# Patient Record
Sex: MALE | Employment: OTHER | URBAN - NONMETROPOLITAN AREA
[De-identification: names, ages, dates, MRNs, and addresses within clinical notes are randomized per-mention and may not be internally consistent; named-entity substitution may affect disease eponyms.]

---

## 2020-01-01 ENCOUNTER — OFFICE VISIT (OUTPATIENT)
Dept: FAMILY MEDICINE | Facility: OTHER | Age: 70
End: 2020-01-01
Attending: PHYSICIAN ASSISTANT
Payer: COMMERCIAL

## 2020-01-01 ENCOUNTER — HOSPITAL ENCOUNTER (INPATIENT)
Facility: HOSPITAL | Age: 70
LOS: 5 days | Discharge: HOME | DRG: 330 | End: 2020-08-05
Attending: EMERGENCY MEDICINE | Admitting: SURGERY
Payer: COMMERCIAL

## 2020-01-01 ENCOUNTER — APPOINTMENT (OUTPATIENT)
Dept: GENERAL RADIOLOGY | Facility: HOSPITAL | Age: 70
DRG: 330 | End: 2020-01-01
Attending: SURGERY
Payer: COMMERCIAL

## 2020-01-01 ENCOUNTER — DOCUMENTATION ONLY (OUTPATIENT)
Dept: OTHER | Facility: CLINIC | Age: 70
End: 2020-01-01

## 2020-01-01 ENCOUNTER — TELEPHONE (OUTPATIENT)
Dept: FAMILY MEDICINE | Facility: OTHER | Age: 70
End: 2020-01-01

## 2020-01-01 ENCOUNTER — APPOINTMENT (OUTPATIENT)
Dept: CT IMAGING | Facility: HOSPITAL | Age: 70
DRG: 330 | End: 2020-01-01
Attending: EMERGENCY MEDICINE
Payer: COMMERCIAL

## 2020-01-01 ENCOUNTER — OFFICE VISIT (OUTPATIENT)
Dept: SURGERY | Facility: OTHER | Age: 70
End: 2020-01-01
Attending: SURGERY
Payer: COMMERCIAL

## 2020-01-01 ENCOUNTER — TRANSFERRED RECORDS (OUTPATIENT)
Dept: HEALTH INFORMATION MANAGEMENT | Facility: CLINIC | Age: 70
End: 2020-01-01

## 2020-01-01 ENCOUNTER — ANESTHESIA (OUTPATIENT)
Dept: SURGERY | Facility: HOSPITAL | Age: 70
DRG: 330 | End: 2020-01-01
Payer: COMMERCIAL

## 2020-01-01 ENCOUNTER — ANESTHESIA EVENT (OUTPATIENT)
Dept: SURGERY | Facility: HOSPITAL | Age: 70
DRG: 330 | End: 2020-01-01
Payer: COMMERCIAL

## 2020-01-01 ENCOUNTER — APPOINTMENT (OUTPATIENT)
Dept: WOUND CARE | Facility: HOSPITAL | Age: 70
End: 2020-01-01
Payer: COMMERCIAL

## 2020-01-01 ENCOUNTER — APPOINTMENT (OUTPATIENT)
Dept: GENERAL RADIOLOGY | Facility: HOSPITAL | Age: 70
DRG: 330 | End: 2020-01-01
Attending: EMERGENCY MEDICINE
Payer: COMMERCIAL

## 2020-01-01 ENCOUNTER — PATIENT OUTREACH (OUTPATIENT)
Dept: ONCOLOGY | Facility: OTHER | Age: 70
End: 2020-01-01

## 2020-01-01 VITALS
DIASTOLIC BLOOD PRESSURE: 78 MMHG | RESPIRATION RATE: 20 BRPM | HEIGHT: 69 IN | BODY MASS INDEX: 23.12 KG/M2 | SYSTOLIC BLOOD PRESSURE: 152 MMHG | HEART RATE: 101 BPM | WEIGHT: 156.09 LBS | TEMPERATURE: 97.4 F | OXYGEN SATURATION: 100 %

## 2020-01-01 VITALS
SYSTOLIC BLOOD PRESSURE: 126 MMHG | HEIGHT: 69 IN | BODY MASS INDEX: 22.07 KG/M2 | HEART RATE: 110 BPM | TEMPERATURE: 97.7 F | DIASTOLIC BLOOD PRESSURE: 74 MMHG | OXYGEN SATURATION: 100 % | WEIGHT: 149 LBS

## 2020-01-01 VITALS
SYSTOLIC BLOOD PRESSURE: 106 MMHG | TEMPERATURE: 96.9 F | WEIGHT: 149 LBS | OXYGEN SATURATION: 92 % | BODY MASS INDEX: 22 KG/M2 | HEART RATE: 100 BPM | DIASTOLIC BLOOD PRESSURE: 50 MMHG

## 2020-01-01 DIAGNOSIS — Z53.9 PERSONS ENCOUNTERING HEALTH SERVICES FOR SPECIFIC PROCEDURES, NOT CARRIED OUT: Primary | ICD-10-CM

## 2020-01-01 DIAGNOSIS — K63.89 MASS OF COLON: Primary | ICD-10-CM

## 2020-01-01 DIAGNOSIS — K63.89 COLONIC MASS: ICD-10-CM

## 2020-01-01 DIAGNOSIS — K63.89 MASS OF HEPATIC FLEXURE OF COLON: Primary | ICD-10-CM

## 2020-01-01 DIAGNOSIS — K56.609 SMALL BOWEL OBSTRUCTION (H): ICD-10-CM

## 2020-01-01 DIAGNOSIS — C18.9 ADENOCARCINOMA OF COLON (H): ICD-10-CM

## 2020-01-01 DIAGNOSIS — J44.9 CHRONIC OBSTRUCTIVE PULMONARY DISEASE, UNSPECIFIED COPD TYPE (H): ICD-10-CM

## 2020-01-01 DIAGNOSIS — Z93.2 S/P ILEOSTOMY (H): ICD-10-CM

## 2020-01-01 DIAGNOSIS — E87.1 HYPONATREMIA: ICD-10-CM

## 2020-01-01 LAB
ABO + RH BLD: NORMAL
ABO + RH BLD: NORMAL
ALBUMIN SERPL-MCNC: 1.6 G/DL (ref 3.4–5)
ALBUMIN SERPL-MCNC: 1.6 G/DL (ref 3.4–5)
ALBUMIN SERPL-MCNC: 2.1 G/DL (ref 3.4–5)
ALBUMIN UR-MCNC: NEGATIVE MG/DL
ALP SERPL-CCNC: 214 U/L (ref 40–150)
ALP SERPL-CCNC: 265 U/L (ref 40–150)
ALP SERPL-CCNC: 267 U/L (ref 40–150)
ALT SERPL W P-5'-P-CCNC: 26 U/L (ref 0–70)
ALT SERPL W P-5'-P-CCNC: 26 U/L (ref 0–70)
ALT SERPL W P-5'-P-CCNC: 27 U/L (ref 0–70)
ANION GAP SERPL CALCULATED.3IONS-SCNC: 4 MMOL/L (ref 3–14)
ANION GAP SERPL CALCULATED.3IONS-SCNC: 5 MMOL/L (ref 3–14)
ANION GAP SERPL CALCULATED.3IONS-SCNC: 7 MMOL/L (ref 3–14)
ANION GAP SERPL CALCULATED.3IONS-SCNC: 8 MMOL/L (ref 3–14)
APPEARANCE UR: CLEAR
AST SERPL W P-5'-P-CCNC: 143 U/L (ref 0–45)
AST SERPL W P-5'-P-CCNC: 187 U/L (ref 0–45)
AST SERPL W P-5'-P-CCNC: 257 U/L (ref 0–45)
BACTERIA #/AREA URNS HPF: ABNORMAL /HPF
BASOPHILS # BLD AUTO: 0 10E9/L (ref 0–0.2)
BASOPHILS NFR BLD AUTO: 0 %
BASOPHILS NFR BLD AUTO: 0.1 %
BILIRUB DIRECT SERPL-MCNC: 0.1 MG/DL (ref 0–0.2)
BILIRUB SERPL-MCNC: 0.4 MG/DL (ref 0.2–1.3)
BILIRUB SERPL-MCNC: 0.4 MG/DL (ref 0.2–1.3)
BILIRUB SERPL-MCNC: 0.6 MG/DL (ref 0.2–1.3)
BILIRUB UR QL STRIP: NEGATIVE
BLD GP AB SCN SERPL QL: NORMAL
BLOOD BANK CMNT PATIENT-IMP: NORMAL
BLOOD BANK CMNT PATIENT-IMP: NORMAL
BUN SERPL-MCNC: 11 MG/DL (ref 7–30)
BUN SERPL-MCNC: 12 MG/DL (ref 7–30)
BUN SERPL-MCNC: 14 MG/DL (ref 7–30)
BUN SERPL-MCNC: 16 MG/DL (ref 7–30)
CALCIUM SERPL-MCNC: 7.5 MG/DL (ref 8.5–10.1)
CALCIUM SERPL-MCNC: 7.9 MG/DL (ref 8.5–10.1)
CALCIUM SERPL-MCNC: 8.1 MG/DL (ref 8.5–10.1)
CALCIUM SERPL-MCNC: 8.5 MG/DL (ref 8.5–10.1)
CHLORIDE SERPL-SCNC: 105 MMOL/L (ref 94–109)
CHLORIDE SERPL-SCNC: 86 MMOL/L (ref 94–109)
CHLORIDE SERPL-SCNC: 96 MMOL/L (ref 94–109)
CHLORIDE SERPL-SCNC: 99 MMOL/L (ref 94–109)
CO2 SERPL-SCNC: 23 MMOL/L (ref 20–32)
CO2 SERPL-SCNC: 26 MMOL/L (ref 20–32)
CO2 SERPL-SCNC: 27 MMOL/L (ref 20–32)
CO2 SERPL-SCNC: 28 MMOL/L (ref 20–32)
COLOR UR AUTO: YELLOW
COPATH REPORT: NORMAL
CREAT SERPL-MCNC: 0.46 MG/DL (ref 0.66–1.25)
CREAT SERPL-MCNC: 0.55 MG/DL (ref 0.66–1.25)
CREAT SERPL-MCNC: 0.59 MG/DL (ref 0.66–1.25)
CREAT SERPL-MCNC: 0.62 MG/DL (ref 0.66–1.25)
DIFFERENTIAL METHOD BLD: ABNORMAL
EOSINOPHIL # BLD AUTO: 0 10E9/L (ref 0–0.7)
EOSINOPHIL # BLD AUTO: 0.1 10E9/L (ref 0–0.7)
EOSINOPHIL NFR BLD AUTO: 0 %
EOSINOPHIL NFR BLD AUTO: 1 %
ERYTHROCYTE [DISTWIDTH] IN BLOOD BY AUTOMATED COUNT: 12.1 % (ref 10–15)
ERYTHROCYTE [DISTWIDTH] IN BLOOD BY AUTOMATED COUNT: 12.1 % (ref 10–15)
ERYTHROCYTE [DISTWIDTH] IN BLOOD BY AUTOMATED COUNT: 12.3 % (ref 10–15)
ERYTHROCYTE [DISTWIDTH] IN BLOOD BY AUTOMATED COUNT: 12.6 % (ref 10–15)
GFR SERPL CREATININE-BSD FRML MDRD: >90 ML/MIN/{1.73_M2}
GLUCOSE SERPL-MCNC: 103 MG/DL (ref 70–99)
GLUCOSE SERPL-MCNC: 104 MG/DL (ref 70–99)
GLUCOSE SERPL-MCNC: 108 MG/DL (ref 70–99)
GLUCOSE SERPL-MCNC: 111 MG/DL (ref 70–99)
GLUCOSE UR STRIP-MCNC: NEGATIVE MG/DL
HCT VFR BLD AUTO: 31.4 % (ref 40–53)
HCT VFR BLD AUTO: 32.9 % (ref 40–53)
HCT VFR BLD AUTO: 33.3 % (ref 40–53)
HCT VFR BLD AUTO: 36.7 % (ref 40–53)
HGB BLD-MCNC: 10.9 G/DL (ref 13.3–17.7)
HGB BLD-MCNC: 11.4 G/DL (ref 13.3–17.7)
HGB BLD-MCNC: 11.5 G/DL (ref 13.3–17.7)
HGB BLD-MCNC: 13.2 G/DL (ref 13.3–17.7)
HGB UR QL STRIP: NEGATIVE
HYALINE CASTS #/AREA URNS LPF: 1 /LPF
IMM GRANULOCYTES # BLD: 0.2 10E9/L (ref 0–0.4)
IMM GRANULOCYTES NFR BLD: 2.7 %
INR PPP: 1.31 (ref 0.86–1.14)
KETONES UR STRIP-MCNC: NEGATIVE MG/DL
LABORATORY COMMENT REPORT: NORMAL
LEUKOCYTE ESTERASE UR QL STRIP: NEGATIVE
LIPASE SERPL-CCNC: 41 U/L (ref 73–393)
LYMPHOCYTES # BLD AUTO: 0.4 10E9/L (ref 0.8–5.3)
LYMPHOCYTES # BLD AUTO: 0.7 10E9/L (ref 0.8–5.3)
LYMPHOCYTES # BLD AUTO: 0.9 10E9/L (ref 0.8–5.3)
LYMPHOCYTES # BLD AUTO: 1.1 10E9/L (ref 0.8–5.3)
LYMPHOCYTES NFR BLD AUTO: 11 %
LYMPHOCYTES NFR BLD AUTO: 12 %
LYMPHOCYTES NFR BLD AUTO: 5.3 %
LYMPHOCYTES NFR BLD AUTO: 9 %
MAGNESIUM SERPL-MCNC: 2.4 MG/DL (ref 1.6–2.3)
MAGNESIUM SERPL-MCNC: 2.8 MG/DL (ref 1.6–2.3)
MCH RBC QN AUTO: 31.5 PG (ref 26.5–33)
MCH RBC QN AUTO: 31.7 PG (ref 26.5–33)
MCH RBC QN AUTO: 31.8 PG (ref 26.5–33)
MCH RBC QN AUTO: 32 PG (ref 26.5–33)
MCHC RBC AUTO-ENTMCNC: 34.2 G/DL (ref 31.5–36.5)
MCHC RBC AUTO-ENTMCNC: 34.7 G/DL (ref 31.5–36.5)
MCHC RBC AUTO-ENTMCNC: 35 G/DL (ref 31.5–36.5)
MCHC RBC AUTO-ENTMCNC: 36 G/DL (ref 31.5–36.5)
MCV RBC AUTO: 89 FL (ref 78–100)
MCV RBC AUTO: 91 FL (ref 78–100)
MCV RBC AUTO: 91 FL (ref 78–100)
MCV RBC AUTO: 93 FL (ref 78–100)
MONOCYTES # BLD AUTO: 0.3 10E9/L (ref 0–1.3)
MONOCYTES # BLD AUTO: 0.4 10E9/L (ref 0–1.3)
MONOCYTES # BLD AUTO: 0.6 10E9/L (ref 0–1.3)
MONOCYTES # BLD AUTO: 0.7 10E9/L (ref 0–1.3)
MONOCYTES NFR BLD AUTO: 3 %
MONOCYTES NFR BLD AUTO: 4 %
MONOCYTES NFR BLD AUTO: 9 %
MONOCYTES NFR BLD AUTO: 9.6 %
MUCOUS THREADS #/AREA URNS LPF: PRESENT /LPF
MYELOCYTES # BLD: 0.2 10E9/L
MYELOCYTES # BLD: 0.3 10E9/L
MYELOCYTES NFR BLD MANUAL: 2 %
MYELOCYTES NFR BLD MANUAL: 3 %
NEUTROPHILS # BLD AUTO: 5.3 10E9/L (ref 1.6–8.3)
NEUTROPHILS # BLD AUTO: 5.7 10E9/L (ref 1.6–8.3)
NEUTROPHILS # BLD AUTO: 7.3 10E9/L (ref 1.6–8.3)
NEUTROPHILS # BLD AUTO: 8.3 10E9/L (ref 1.6–8.3)
NEUTROPHILS NFR BLD AUTO: 79 %
NEUTROPHILS NFR BLD AUTO: 80 %
NEUTROPHILS NFR BLD AUTO: 82.3 %
NEUTROPHILS NFR BLD AUTO: 84 %
NEUTS BAND # BLD AUTO: 0.3 10E9/L (ref 0–0.6)
NEUTS BAND NFR BLD MANUAL: 3 %
NITRATE UR QL: NEGATIVE
NRBC # BLD AUTO: 0 10*3/UL
NRBC BLD AUTO-RTO: 0 /100
PH UR STRIP: 6 PH (ref 4.7–8)
PLATELET # BLD AUTO: 226 10E9/L (ref 150–450)
PLATELET # BLD AUTO: 230 10E9/L (ref 150–450)
PLATELET # BLD AUTO: 249 10E9/L (ref 150–450)
PLATELET # BLD AUTO: 253 10E9/L (ref 150–450)
PLATELET # BLD AUTO: 295 10E9/L (ref 150–450)
PLATELET # BLD EST: ABNORMAL 10*3/UL
PLATELET # BLD EST: ABNORMAL 10*3/UL
POTASSIUM SERPL-SCNC: 3.9 MMOL/L (ref 3.4–5.3)
POTASSIUM SERPL-SCNC: 4.1 MMOL/L (ref 3.4–5.3)
POTASSIUM SERPL-SCNC: 4.5 MMOL/L (ref 3.4–5.3)
POTASSIUM SERPL-SCNC: 5.1 MMOL/L (ref 3.4–5.3)
PROCALCITONIN SERPL-MCNC: 0.25 NG/ML
PROT SERPL-MCNC: 5.1 G/DL (ref 6.8–8.8)
PROT SERPL-MCNC: 5.3 G/DL (ref 6.8–8.8)
PROT SERPL-MCNC: 6.5 G/DL (ref 6.8–8.8)
RBC # BLD AUTO: 3.46 10E12/L (ref 4.4–5.9)
RBC # BLD AUTO: 3.59 10E12/L (ref 4.4–5.9)
RBC # BLD AUTO: 3.63 10E12/L (ref 4.4–5.9)
RBC # BLD AUTO: 4.13 10E12/L (ref 4.4–5.9)
RBC #/AREA URNS AUTO: 1 /HPF (ref 0–2)
RBC MORPH BLD: ABNORMAL
RBC MORPH BLD: ABNORMAL
SARS-COV-2 RNA SPEC QL NAA+PROBE: NEGATIVE
SARS-COV-2 RNA SPEC QL NAA+PROBE: NORMAL
SODIUM SERPL-SCNC: 122 MMOL/L (ref 133–144)
SODIUM SERPL-SCNC: 130 MMOL/L (ref 133–144)
SODIUM SERPL-SCNC: 130 MMOL/L (ref 133–144)
SODIUM SERPL-SCNC: 132 MMOL/L (ref 133–144)
SOURCE: ABNORMAL
SP GR UR STRIP: 1.02 (ref 1–1.03)
SPECIMEN EXP DATE BLD: NORMAL
SPECIMEN SOURCE: NORMAL
SPECIMEN SOURCE: NORMAL
UROBILINOGEN UR STRIP-MCNC: 4 MG/DL (ref 0–2)
WBC # BLD AUTO: 6.6 10E9/L (ref 4–11)
WBC # BLD AUTO: 7 10E9/L (ref 4–11)
WBC # BLD AUTO: 9.2 10E9/L (ref 4–11)
WBC # BLD AUTO: 9.9 10E9/L (ref 4–11)
WBC #/AREA URNS AUTO: 1 /HPF (ref 0–5)

## 2020-01-01 PROCEDURE — G0463 HOSPITAL OUTPT CLINIC VISIT: HCPCS

## 2020-01-01 PROCEDURE — 81445 SO NEO GSAP 5-50DNA/DNA&RNA: CPT | Mod: ZL,59 | Performed by: INTERNAL MEDICINE

## 2020-01-01 PROCEDURE — 99285 EMERGENCY DEPT VISIT HI MDM: CPT | Mod: Z6 | Performed by: EMERGENCY MEDICINE

## 2020-01-01 PROCEDURE — 80048 BASIC METABOLIC PNL TOTAL CA: CPT | Performed by: INTERNAL MEDICINE

## 2020-01-01 PROCEDURE — 80053 COMPREHEN METABOLIC PANEL: CPT | Performed by: INTERNAL MEDICINE

## 2020-01-01 PROCEDURE — 44144 PARTIAL REMOVAL OF COLON: CPT | Performed by: NURSE ANESTHETIST, CERTIFIED REGISTERED

## 2020-01-01 PROCEDURE — 25000566 ZZH SEVOFLURANE, EA 15 MIN: Performed by: NURSE ANESTHETIST, CERTIFIED REGISTERED

## 2020-01-01 PROCEDURE — 25000125 ZZHC RX 250

## 2020-01-01 PROCEDURE — 25800030 ZZH RX IP 258 OP 636: Performed by: SURGERY

## 2020-01-01 PROCEDURE — 25000125 ZZHC RX 250: Performed by: INTERNAL MEDICINE

## 2020-01-01 PROCEDURE — 25000132 ZZH RX MED GY IP 250 OP 250 PS 637: Performed by: SURGERY

## 2020-01-01 PROCEDURE — 27210794 ZZH OR GENERAL SUPPLY STERILE: Performed by: SURGERY

## 2020-01-01 PROCEDURE — 25800025 ZZH RX 258: Performed by: SURGERY

## 2020-01-01 PROCEDURE — 99232 SBSQ HOSP IP/OBS MODERATE 35: CPT | Mod: 24 | Performed by: INTERNAL MEDICINE

## 2020-01-01 PROCEDURE — 88307 TISSUE EXAM BY PATHOLOGIST: CPT | Mod: TC | Performed by: SURGERY

## 2020-01-01 PROCEDURE — 85025 COMPLETE CBC W/AUTO DIFF WBC: CPT | Performed by: INTERNAL MEDICINE

## 2020-01-01 PROCEDURE — 94640 AIRWAY INHALATION TREATMENT: CPT

## 2020-01-01 PROCEDURE — 81445 SO NEO GSAP 5-50DNA/DNA&RNA: CPT | Mod: ZL | Performed by: INTERNAL MEDICINE

## 2020-01-01 PROCEDURE — 25000132 ZZH RX MED GY IP 250 OP 250 PS 637: Performed by: INTERNAL MEDICINE

## 2020-01-01 PROCEDURE — 83690 ASSAY OF LIPASE: CPT | Performed by: EMERGENCY MEDICINE

## 2020-01-01 PROCEDURE — 12000000 ZZH R&B MED SURG/OB

## 2020-01-01 PROCEDURE — 25000125 ZZHC RX 250: Performed by: NURSE ANESTHETIST, CERTIFIED REGISTERED

## 2020-01-01 PROCEDURE — 25000128 H RX IP 250 OP 636: Performed by: SURGERY

## 2020-01-01 PROCEDURE — 36000056 ZZH SURGERY LEVEL 3 1ST 30 MIN: Performed by: SURGERY

## 2020-01-01 PROCEDURE — 80053 COMPREHEN METABOLIC PANEL: CPT | Performed by: SURGERY

## 2020-01-01 PROCEDURE — 99285 EMERGENCY DEPT VISIT HI MDM: CPT | Mod: 25

## 2020-01-01 PROCEDURE — 40000275 ZZH STATISTIC RCP TIME EA 10 MIN

## 2020-01-01 PROCEDURE — 74177 CT ABD & PELVIS W/CONTRAST: CPT | Mod: TC

## 2020-01-01 PROCEDURE — 36415 COLL VENOUS BLD VENIPUNCTURE: CPT | Performed by: INTERNAL MEDICINE

## 2020-01-01 PROCEDURE — 85610 PROTHROMBIN TIME: CPT | Performed by: SURGERY

## 2020-01-01 PROCEDURE — 36000058 ZZH SURGERY LEVEL 3 EA 15 ADDTL MIN: Performed by: SURGERY

## 2020-01-01 PROCEDURE — 94640 AIRWAY INHALATION TREATMENT: CPT | Mod: 76

## 2020-01-01 PROCEDURE — 96374 THER/PROPH/DIAG INJ IV PUSH: CPT

## 2020-01-01 PROCEDURE — 44310 ILEOSTOMY/JEJUNOSTOMY: CPT | Performed by: SURGERY

## 2020-01-01 PROCEDURE — 0D1B0Z4 BYPASS ILEUM TO CUTANEOUS, OPEN APPROACH: ICD-10-PCS | Performed by: SURGERY

## 2020-01-01 PROCEDURE — 71000014 ZZH RECOVERY PHASE 1 LEVEL 2 FIRST HR: Performed by: SURGERY

## 2020-01-01 PROCEDURE — 81001 URINALYSIS AUTO W/SCOPE: CPT | Performed by: EMERGENCY MEDICINE

## 2020-01-01 PROCEDURE — 99223 1ST HOSP IP/OBS HIGH 75: CPT | Mod: 25 | Performed by: INTERNAL MEDICINE

## 2020-01-01 PROCEDURE — 96376 TX/PRO/DX INJ SAME DRUG ADON: CPT

## 2020-01-01 PROCEDURE — 25000128 H RX IP 250 OP 636: Performed by: NURSE ANESTHETIST, CERTIFIED REGISTERED

## 2020-01-01 PROCEDURE — 86901 BLOOD TYPING SEROLOGIC RH(D): CPT | Performed by: SURGERY

## 2020-01-01 PROCEDURE — 37000009 ZZH ANESTHESIA TECHNICAL FEE, EACH ADDTL 15 MIN: Performed by: SURGERY

## 2020-01-01 PROCEDURE — 85025 COMPLETE CBC W/AUTO DIFF WBC: CPT | Performed by: SURGERY

## 2020-01-01 PROCEDURE — 25800030 ZZH RX IP 258 OP 636: Performed by: NURSE ANESTHETIST, CERTIFIED REGISTERED

## 2020-01-01 PROCEDURE — 00000159 ZZHCL STATISTIC H-SEND OUTS PREP: Performed by: SURGERY

## 2020-01-01 PROCEDURE — 25000128 H RX IP 250 OP 636: Performed by: EMERGENCY MEDICINE

## 2020-01-01 PROCEDURE — 36415 COLL VENOUS BLD VENIPUNCTURE: CPT | Performed by: SURGERY

## 2020-01-01 PROCEDURE — 83735 ASSAY OF MAGNESIUM: CPT | Performed by: SURGERY

## 2020-01-01 PROCEDURE — 25000128 H RX IP 250 OP 636: Performed by: INTERNAL MEDICINE

## 2020-01-01 PROCEDURE — 25500064 ZZH RX 255 OP 636: Performed by: RADIOLOGY

## 2020-01-01 PROCEDURE — 27110028 ZZH OR GENERAL SUPPLY NON-STERILE: Performed by: SURGERY

## 2020-01-01 PROCEDURE — 83735 ASSAY OF MAGNESIUM: CPT | Performed by: INTERNAL MEDICINE

## 2020-01-01 PROCEDURE — 99233 SBSQ HOSP IP/OBS HIGH 50: CPT | Mod: 24 | Performed by: INTERNAL MEDICINE

## 2020-01-01 PROCEDURE — 37000008 ZZH ANESTHESIA TECHNICAL FEE, 1ST 30 MIN: Performed by: SURGERY

## 2020-01-01 PROCEDURE — 99024 POSTOP FOLLOW-UP VISIT: CPT | Performed by: SURGERY

## 2020-01-01 PROCEDURE — 36415 COLL VENOUS BLD VENIPUNCTURE: CPT | Mod: ZL | Performed by: INTERNAL MEDICINE

## 2020-01-01 PROCEDURE — 85025 COMPLETE CBC W/AUTO DIFF WBC: CPT | Performed by: EMERGENCY MEDICINE

## 2020-01-01 PROCEDURE — 81301 MICROSATELLITE INSTABILITY: CPT | Mod: ZL,59 | Performed by: INTERNAL MEDICINE

## 2020-01-01 PROCEDURE — G0180 MD CERTIFICATION HHA PATIENT: HCPCS | Performed by: FAMILY MEDICINE

## 2020-01-01 PROCEDURE — 86850 RBC ANTIBODY SCREEN: CPT | Performed by: SURGERY

## 2020-01-01 PROCEDURE — 99231 SBSQ HOSP IP/OBS SF/LOW 25: CPT | Mod: 24 | Performed by: INTERNAL MEDICINE

## 2020-01-01 PROCEDURE — 25000132 ZZH RX MED GY IP 250 OP 250 PS 637: Performed by: EMERGENCY MEDICINE

## 2020-01-01 PROCEDURE — 74018 RADEX ABDOMEN 1 VIEW: CPT | Mod: TC

## 2020-01-01 PROCEDURE — 71045 X-RAY EXAM CHEST 1 VIEW: CPT | Mod: TC

## 2020-01-01 PROCEDURE — 80076 HEPATIC FUNCTION PANEL: CPT | Performed by: EMERGENCY MEDICINE

## 2020-01-01 PROCEDURE — 80048 BASIC METABOLIC PNL TOTAL CA: CPT | Performed by: EMERGENCY MEDICINE

## 2020-01-01 PROCEDURE — G0463 HOSPITAL OUTPT CLINIC VISIT: HCPCS | Mod: 27

## 2020-01-01 PROCEDURE — 0DBU0ZX EXCISION OF OMENTUM, OPEN APPROACH, DIAGNOSTIC: ICD-10-PCS | Performed by: SURGERY

## 2020-01-01 PROCEDURE — C9803 HOPD COVID-19 SPEC COLLECT: HCPCS

## 2020-01-01 PROCEDURE — 86900 BLOOD TYPING SEROLOGIC ABO: CPT | Performed by: SURGERY

## 2020-01-01 PROCEDURE — 85049 AUTOMATED PLATELET COUNT: CPT | Performed by: SURGERY

## 2020-01-01 PROCEDURE — 87635 SARS-COV-2 COVID-19 AMP PRB: CPT | Performed by: EMERGENCY MEDICINE

## 2020-01-01 PROCEDURE — 84145 PROCALCITONIN (PCT): CPT | Performed by: EMERGENCY MEDICINE

## 2020-01-01 PROCEDURE — 99239 HOSP IP/OBS DSCHRG MGMT >30: CPT | Mod: 24 | Performed by: NURSE PRACTITIONER

## 2020-01-01 PROCEDURE — 99213 OFFICE O/P EST LOW 20 MIN: CPT | Performed by: PHYSICIAN ASSISTANT

## 2020-01-01 PROCEDURE — 25000125 ZZHC RX 250: Performed by: SURGERY

## 2020-01-01 PROCEDURE — 99223 1ST HOSP IP/OBS HIGH 75: CPT | Mod: 57 | Performed by: SURGERY

## 2020-01-01 RX ORDER — MEPERIDINE HYDROCHLORIDE 25 MG/ML
12.5 INJECTION INTRAMUSCULAR; INTRAVENOUS; SUBCUTANEOUS EVERY 5 MIN PRN
Status: DISCONTINUED | OUTPATIENT
Start: 2020-01-01 | End: 2020-01-01 | Stop reason: HOSPADM

## 2020-01-01 RX ORDER — NALOXONE HYDROCHLORIDE 0.4 MG/ML
.1-.4 INJECTION, SOLUTION INTRAMUSCULAR; INTRAVENOUS; SUBCUTANEOUS
Status: DISCONTINUED | OUTPATIENT
Start: 2020-01-01 | End: 2020-01-01 | Stop reason: HOSPADM

## 2020-01-01 RX ORDER — HYDROCODONE BITARTRATE AND ACETAMINOPHEN 5; 325 MG/1; MG/1
1 TABLET ORAL ONCE
Status: COMPLETED | OUTPATIENT
Start: 2020-01-01 | End: 2020-01-01

## 2020-01-01 RX ORDER — LIDOCAINE 40 MG/G
CREAM TOPICAL
Status: DISCONTINUED | OUTPATIENT
Start: 2020-01-01 | End: 2020-01-01

## 2020-01-01 RX ORDER — BUPIVACAINE HYDROCHLORIDE AND EPINEPHRINE 2.5; 5 MG/ML; UG/ML
INJECTION, SOLUTION INFILTRATION; PERINEURAL PRN
Status: DISCONTINUED | OUTPATIENT
Start: 2020-01-01 | End: 2020-01-01 | Stop reason: HOSPADM

## 2020-01-01 RX ORDER — NALOXONE HYDROCHLORIDE 0.4 MG/ML
.1-.4 INJECTION, SOLUTION INTRAMUSCULAR; INTRAVENOUS; SUBCUTANEOUS
Status: DISCONTINUED | OUTPATIENT
Start: 2020-01-01 | End: 2020-01-01

## 2020-01-01 RX ORDER — FENTANYL CITRATE 50 UG/ML
INJECTION, SOLUTION INTRAMUSCULAR; INTRAVENOUS PRN
Status: DISCONTINUED | OUTPATIENT
Start: 2020-01-01 | End: 2020-01-01

## 2020-01-01 RX ORDER — ACETAMINOPHEN 325 MG/1
975 TABLET ORAL EVERY 6 HOURS PRN
COMMUNITY
Start: 2020-01-01

## 2020-01-01 RX ORDER — ONDANSETRON 2 MG/ML
INJECTION INTRAMUSCULAR; INTRAVENOUS PRN
Status: DISCONTINUED | OUTPATIENT
Start: 2020-01-01 | End: 2020-01-01

## 2020-01-01 RX ORDER — ONDANSETRON 2 MG/ML
4 INJECTION INTRAMUSCULAR; INTRAVENOUS EVERY 30 MIN PRN
Status: DISCONTINUED | OUTPATIENT
Start: 2020-01-01 | End: 2020-01-01 | Stop reason: HOSPADM

## 2020-01-01 RX ORDER — HYDROMORPHONE HYDROCHLORIDE 1 MG/ML
.3-.5 INJECTION, SOLUTION INTRAMUSCULAR; INTRAVENOUS; SUBCUTANEOUS
Status: DISCONTINUED | OUTPATIENT
Start: 2020-01-01 | End: 2020-01-01

## 2020-01-01 RX ORDER — LIDOCAINE HYDROCHLORIDE 20 MG/ML
INJECTION, SOLUTION INFILTRATION; PERINEURAL PRN
Status: DISCONTINUED | OUTPATIENT
Start: 2020-01-01 | End: 2020-01-01

## 2020-01-01 RX ORDER — FENTANYL CITRATE 50 UG/ML
25-50 INJECTION, SOLUTION INTRAMUSCULAR; INTRAVENOUS
Status: DISCONTINUED | OUTPATIENT
Start: 2020-01-01 | End: 2020-01-01 | Stop reason: HOSPADM

## 2020-01-01 RX ORDER — SODIUM CHLORIDE, SODIUM LACTATE, POTASSIUM CHLORIDE, CALCIUM CHLORIDE 600; 310; 30; 20 MG/100ML; MG/100ML; MG/100ML; MG/100ML
INJECTION, SOLUTION INTRAVENOUS CONTINUOUS
Status: DISCONTINUED | OUTPATIENT
Start: 2020-01-01 | End: 2020-01-01

## 2020-01-01 RX ORDER — KETOROLAC TROMETHAMINE 15 MG/ML
15 INJECTION, SOLUTION INTRAMUSCULAR; INTRAVENOUS EVERY 6 HOURS
Status: DISCONTINUED | OUTPATIENT
Start: 2020-01-01 | End: 2020-01-01

## 2020-01-01 RX ORDER — CEFOTETAN AND DEXTROSE 1 G/50ML
1 INJECTION, SOLUTION INTRAVENOUS SEE ADMIN INSTRUCTIONS
Status: DISCONTINUED | OUTPATIENT
Start: 2020-01-01 | End: 2020-01-01

## 2020-01-01 RX ORDER — DEXTROSE MONOHYDRATE, SODIUM CHLORIDE, AND POTASSIUM CHLORIDE 50; 1.49; 9 G/1000ML; G/1000ML; G/1000ML
INJECTION, SOLUTION INTRAVENOUS CONTINUOUS
Status: DISCONTINUED | OUTPATIENT
Start: 2020-01-01 | End: 2020-01-01

## 2020-01-01 RX ORDER — IPRATROPIUM BROMIDE AND ALBUTEROL SULFATE 2.5; .5 MG/3ML; MG/3ML
SOLUTION RESPIRATORY (INHALATION)
Status: COMPLETED
Start: 2020-01-01 | End: 2020-01-01

## 2020-01-01 RX ORDER — KETOROLAC TROMETHAMINE 15 MG/ML
15 INJECTION, SOLUTION INTRAMUSCULAR; INTRAVENOUS EVERY 6 HOURS
Status: DISCONTINUED | OUTPATIENT
Start: 2020-01-01 | End: 2020-01-01 | Stop reason: HOSPADM

## 2020-01-01 RX ORDER — HYDROMORPHONE HYDROCHLORIDE 1 MG/ML
.3-.5 INJECTION, SOLUTION INTRAMUSCULAR; INTRAVENOUS; SUBCUTANEOUS EVERY 5 MIN PRN
Status: DISCONTINUED | OUTPATIENT
Start: 2020-01-01 | End: 2020-01-01 | Stop reason: HOSPADM

## 2020-01-01 RX ORDER — NALOXONE HYDROCHLORIDE 0.4 MG/ML
.1-.4 INJECTION, SOLUTION INTRAMUSCULAR; INTRAVENOUS; SUBCUTANEOUS
Status: ACTIVE | OUTPATIENT
Start: 2020-01-01 | End: 2020-01-01

## 2020-01-01 RX ORDER — ACETAMINOPHEN 325 MG/1
975 TABLET ORAL ONCE
Status: DISCONTINUED | OUTPATIENT
Start: 2020-01-01 | End: 2020-01-01 | Stop reason: HOSPADM

## 2020-01-01 RX ORDER — ACETAMINOPHEN 325 MG/1
TABLET ORAL
Status: DISCONTINUED
Start: 2020-01-01 | End: 2020-01-01 | Stop reason: HOSPADM

## 2020-01-01 RX ORDER — NICOTINE 21 MG/24HR
1 PATCH, TRANSDERMAL 24 HOURS TRANSDERMAL DAILY
Status: DISCONTINUED | OUTPATIENT
Start: 2020-01-01 | End: 2020-01-01

## 2020-01-01 RX ORDER — SODIUM CHLORIDE, SODIUM LACTATE, POTASSIUM CHLORIDE, CALCIUM CHLORIDE 600; 310; 30; 20 MG/100ML; MG/100ML; MG/100ML; MG/100ML
INJECTION, SOLUTION INTRAVENOUS CONTINUOUS PRN
Status: DISCONTINUED | OUTPATIENT
Start: 2020-01-01 | End: 2020-01-01

## 2020-01-01 RX ORDER — ACETAMINOPHEN 325 MG/1
975 TABLET ORAL EVERY 6 HOURS
Status: DISCONTINUED | OUTPATIENT
Start: 2020-01-01 | End: 2020-01-01 | Stop reason: HOSPADM

## 2020-01-01 RX ORDER — ALBUTEROL SULFATE 90 UG/1
2 AEROSOL, METERED RESPIRATORY (INHALATION) EVERY 6 HOURS PRN
DISCHARGE
Start: 2020-01-01

## 2020-01-01 RX ORDER — HYDROMORPHONE HYDROCHLORIDE 1 MG/ML
0.5 INJECTION, SOLUTION INTRAMUSCULAR; INTRAVENOUS; SUBCUTANEOUS ONCE
Status: COMPLETED | OUTPATIENT
Start: 2020-01-01 | End: 2020-01-01

## 2020-01-01 RX ORDER — ALBUTEROL SULFATE 90 UG/1
2 AEROSOL, METERED RESPIRATORY (INHALATION) EVERY 6 HOURS PRN
Status: DISCONTINUED | OUTPATIENT
Start: 2020-01-01 | End: 2020-01-01 | Stop reason: HOSPADM

## 2020-01-01 RX ORDER — PROPOFOL 10 MG/ML
INJECTION, EMULSION INTRAVENOUS PRN
Status: DISCONTINUED | OUTPATIENT
Start: 2020-01-01 | End: 2020-01-01

## 2020-01-01 RX ORDER — LIDOCAINE 4 G/G
1 PATCH TOPICAL
Status: DISCONTINUED | OUTPATIENT
Start: 2020-01-01 | End: 2020-01-01 | Stop reason: HOSPADM

## 2020-01-01 RX ORDER — DEXAMETHASONE SODIUM PHOSPHATE 10 MG/ML
INJECTION, SOLUTION INTRAMUSCULAR; INTRAVENOUS PRN
Status: DISCONTINUED | OUTPATIENT
Start: 2020-01-01 | End: 2020-01-01

## 2020-01-01 RX ORDER — ACETAMINOPHEN 10 MG/ML
1000 INJECTION, SOLUTION INTRAVENOUS EVERY 6 HOURS
Status: DISCONTINUED | OUTPATIENT
Start: 2020-01-01 | End: 2020-01-01

## 2020-01-01 RX ORDER — HYDROCODONE BITARTRATE AND ACETAMINOPHEN 5; 325 MG/1; MG/1
TABLET ORAL
Qty: 30 TABLET | Refills: 0 | Status: SHIPPED | OUTPATIENT
Start: 2020-01-01

## 2020-01-01 RX ORDER — IOPAMIDOL 612 MG/ML
100 INJECTION, SOLUTION INTRAVASCULAR ONCE
Status: COMPLETED | OUTPATIENT
Start: 2020-01-01 | End: 2020-01-01

## 2020-01-01 RX ORDER — TRAMADOL HYDROCHLORIDE 50 MG/1
100 TABLET ORAL EVERY 6 HOURS PRN
COMMUNITY

## 2020-01-01 RX ORDER — SODIUM CHLORIDE, SODIUM LACTATE, POTASSIUM CHLORIDE, CALCIUM CHLORIDE 600; 310; 30; 20 MG/100ML; MG/100ML; MG/100ML; MG/100ML
INJECTION, SOLUTION INTRAVENOUS CONTINUOUS
Status: DISCONTINUED | OUTPATIENT
Start: 2020-01-01 | End: 2020-01-01 | Stop reason: HOSPADM

## 2020-01-01 RX ORDER — IPRATROPIUM BROMIDE AND ALBUTEROL SULFATE 2.5; .5 MG/3ML; MG/3ML
3 SOLUTION RESPIRATORY (INHALATION) EVERY 4 HOURS PRN
Status: DISCONTINUED | OUTPATIENT
Start: 2020-01-01 | End: 2020-01-01 | Stop reason: HOSPADM

## 2020-01-01 RX ORDER — CEFOTETAN AND DEXTROSE 1 G/50ML
1 INJECTION, SOLUTION INTRAVENOUS
Status: DISCONTINUED | OUTPATIENT
Start: 2020-01-01 | End: 2020-01-01

## 2020-01-01 RX ORDER — ONDANSETRON 4 MG/1
4 TABLET, ORALLY DISINTEGRATING ORAL EVERY 30 MIN PRN
Status: DISCONTINUED | OUTPATIENT
Start: 2020-01-01 | End: 2020-01-01 | Stop reason: HOSPADM

## 2020-01-01 RX ORDER — HYDROMORPHONE HYDROCHLORIDE 1 MG/ML
0.3 INJECTION, SOLUTION INTRAMUSCULAR; INTRAVENOUS; SUBCUTANEOUS
Status: DISCONTINUED | OUTPATIENT
Start: 2020-01-01 | End: 2020-01-01 | Stop reason: HOSPADM

## 2020-01-01 RX ORDER — LABETALOL 20 MG/4 ML (5 MG/ML) INTRAVENOUS SYRINGE
10
Status: DISCONTINUED | OUTPATIENT
Start: 2020-01-01 | End: 2020-01-01 | Stop reason: HOSPADM

## 2020-01-01 RX ORDER — HYDROMORPHONE HYDROCHLORIDE 1 MG/ML
0.5 INJECTION, SOLUTION INTRAMUSCULAR; INTRAVENOUS; SUBCUTANEOUS
Status: DISCONTINUED | OUTPATIENT
Start: 2020-01-01 | End: 2020-01-01

## 2020-01-01 RX ORDER — LIDOCAINE 40 MG/G
CREAM TOPICAL
Status: DISCONTINUED | OUTPATIENT
Start: 2020-01-01 | End: 2020-01-01 | Stop reason: HOSPADM

## 2020-01-01 RX ADMIN — ENOXAPARIN SODIUM 40 MG: 40 INJECTION SUBCUTANEOUS at 21:22

## 2020-01-01 RX ADMIN — KETOROLAC TROMETHAMINE 15 MG: 15 INJECTION, SOLUTION INTRAMUSCULAR; INTRAVENOUS at 05:55

## 2020-01-01 RX ADMIN — KETOROLAC TROMETHAMINE 15 MG: 15 INJECTION, SOLUTION INTRAMUSCULAR; INTRAVENOUS at 17:00

## 2020-01-01 RX ADMIN — HYDROMORPHONE HYDROCHLORIDE 0.5 MG: 1 INJECTION, SOLUTION INTRAMUSCULAR; INTRAVENOUS; SUBCUTANEOUS at 10:26

## 2020-01-01 RX ADMIN — ACETAMINOPHEN 975 MG: 325 TABLET, FILM COATED ORAL at 14:22

## 2020-01-01 RX ADMIN — SODIUM CHLORIDE, POTASSIUM CHLORIDE, SODIUM LACTATE AND CALCIUM CHLORIDE: 600; 310; 30; 20 INJECTION, SOLUTION INTRAVENOUS at 13:00

## 2020-01-01 RX ADMIN — HYDROMORPHONE HYDROCHLORIDE 0.5 MG: 1 INJECTION, SOLUTION INTRAMUSCULAR; INTRAVENOUS; SUBCUTANEOUS at 17:24

## 2020-01-01 RX ADMIN — ENOXAPARIN SODIUM 40 MG: 40 INJECTION SUBCUTANEOUS at 20:32

## 2020-01-01 RX ADMIN — KETOROLAC TROMETHAMINE 15 MG: 15 INJECTION, SOLUTION INTRAMUSCULAR; INTRAVENOUS at 17:38

## 2020-01-01 RX ADMIN — POTASSIUM CHLORIDE, DEXTROSE MONOHYDRATE AND SODIUM CHLORIDE: 150; 5; 900 INJECTION, SOLUTION INTRAVENOUS at 07:40

## 2020-01-01 RX ADMIN — HYDROMORPHONE HYDROCHLORIDE 0.5 MG: 1 INJECTION, SOLUTION INTRAMUSCULAR; INTRAVENOUS; SUBCUTANEOUS at 23:19

## 2020-01-01 RX ADMIN — FENTANYL CITRATE 25 MCG: 50 INJECTION, SOLUTION INTRAMUSCULAR; INTRAVENOUS at 19:47

## 2020-01-01 RX ADMIN — Medication 1 PATCH: at 02:30

## 2020-01-01 RX ADMIN — FENTANYL CITRATE 25 MCG: 50 INJECTION, SOLUTION INTRAMUSCULAR; INTRAVENOUS at 19:52

## 2020-01-01 RX ADMIN — KETOROLAC TROMETHAMINE 15 MG: 15 INJECTION, SOLUTION INTRAMUSCULAR; INTRAVENOUS at 22:46

## 2020-01-01 RX ADMIN — ACETAMINOPHEN 975 MG: 325 TABLET, FILM COATED ORAL at 11:30

## 2020-01-01 RX ADMIN — ACETAMINOPHEN 975 MG: 325 TABLET, FILM COATED ORAL at 05:07

## 2020-01-01 RX ADMIN — DEXAMETHASONE SODIUM PHOSPHATE 10 MG: 10 INJECTION, SOLUTION INTRAMUSCULAR; INTRAVENOUS at 18:10

## 2020-01-01 RX ADMIN — HYDROMORPHONE HYDROCHLORIDE 0.5 MG: 1 INJECTION, SOLUTION INTRAMUSCULAR; INTRAVENOUS; SUBCUTANEOUS at 12:36

## 2020-01-01 RX ADMIN — HYDROMORPHONE HYDROCHLORIDE 0.4 MG: 1 INJECTION, SOLUTION INTRAMUSCULAR; INTRAVENOUS; SUBCUTANEOUS at 02:31

## 2020-01-01 RX ADMIN — ROCURONIUM BROMIDE 10 MG: 10 INJECTION INTRAVENOUS at 17:55

## 2020-01-01 RX ADMIN — FENTANYL CITRATE 25 MCG: 50 INJECTION, SOLUTION INTRAMUSCULAR; INTRAVENOUS at 19:25

## 2020-01-01 RX ADMIN — KETOROLAC TROMETHAMINE 15 MG: 15 INJECTION, SOLUTION INTRAMUSCULAR; INTRAVENOUS at 05:01

## 2020-01-01 RX ADMIN — KETOROLAC TROMETHAMINE 15 MG: 15 INJECTION, SOLUTION INTRAMUSCULAR; INTRAVENOUS at 11:42

## 2020-01-01 RX ADMIN — IPRATROPIUM BROMIDE AND ALBUTEROL SULFATE 3 ML: .5; 3 SOLUTION RESPIRATORY (INHALATION) at 20:00

## 2020-01-01 RX ADMIN — HYDROMORPHONE HYDROCHLORIDE 0.3 MG: 1 INJECTION, SOLUTION INTRAMUSCULAR; INTRAVENOUS; SUBCUTANEOUS at 23:59

## 2020-01-01 RX ADMIN — KETOROLAC TROMETHAMINE 15 MG: 15 INJECTION, SOLUTION INTRAMUSCULAR; INTRAVENOUS at 21:45

## 2020-01-01 RX ADMIN — KETOROLAC TROMETHAMINE 15 MG: 15 INJECTION, SOLUTION INTRAMUSCULAR; INTRAVENOUS at 10:28

## 2020-01-01 RX ADMIN — ACETAMINOPHEN 975 MG: 325 TABLET, FILM COATED ORAL at 05:58

## 2020-01-01 RX ADMIN — SODIUM CHLORIDE, POTASSIUM CHLORIDE, SODIUM LACTATE AND CALCIUM CHLORIDE: 600; 310; 30; 20 INJECTION, SOLUTION INTRAVENOUS at 17:51

## 2020-01-01 RX ADMIN — KETOROLAC TROMETHAMINE 15 MG: 15 INJECTION, SOLUTION INTRAMUSCULAR; INTRAVENOUS at 17:47

## 2020-01-01 RX ADMIN — KETOROLAC TROMETHAMINE 15 MG: 15 INJECTION, SOLUTION INTRAMUSCULAR; INTRAVENOUS at 05:58

## 2020-01-01 RX ADMIN — UMECLIDINIUM 1 PUFF: 62.5 AEROSOL, POWDER ORAL at 08:33

## 2020-01-01 RX ADMIN — Medication 100 MG: at 17:55

## 2020-01-01 RX ADMIN — PROPOFOL 130 MG: 10 INJECTION, EMULSION INTRAVENOUS at 17:55

## 2020-01-01 RX ADMIN — ENOXAPARIN SODIUM 40 MG: 40 INJECTION SUBCUTANEOUS at 20:47

## 2020-01-01 RX ADMIN — SODIUM CHLORIDE, POTASSIUM CHLORIDE, SODIUM LACTATE AND CALCIUM CHLORIDE: 600; 310; 30; 20 INJECTION, SOLUTION INTRAVENOUS at 21:18

## 2020-01-01 RX ADMIN — HYDROMORPHONE HYDROCHLORIDE 0.5 MG: 1 INJECTION, SOLUTION INTRAMUSCULAR; INTRAVENOUS; SUBCUTANEOUS at 14:22

## 2020-01-01 RX ADMIN — UMECLIDINIUM 1 PUFF: 62.5 AEROSOL, POWDER ORAL at 10:35

## 2020-01-01 RX ADMIN — ACETAMINOPHEN 975 MG: 325 TABLET, FILM COATED ORAL at 22:46

## 2020-01-01 RX ADMIN — IOPAMIDOL 100 ML: 612 INJECTION, SOLUTION INTRAVENOUS at 09:38

## 2020-01-01 RX ADMIN — ACETAMINOPHEN 975 MG: 325 TABLET, FILM COATED ORAL at 17:46

## 2020-01-01 RX ADMIN — KETOROLAC TROMETHAMINE 15 MG: 15 INJECTION, SOLUTION INTRAMUSCULAR; INTRAVENOUS at 23:25

## 2020-01-01 RX ADMIN — LIDOCAINE HYDROCHLORIDE 40 MG: 20 INJECTION, SOLUTION INFILTRATION; PERINEURAL at 17:55

## 2020-01-01 RX ADMIN — FENTANYL CITRATE 25 MCG: 50 INJECTION, SOLUTION INTRAMUSCULAR; INTRAVENOUS at 19:49

## 2020-01-01 RX ADMIN — SODIUM CHLORIDE, POTASSIUM CHLORIDE, SODIUM LACTATE AND CALCIUM CHLORIDE: 600; 310; 30; 20 INJECTION, SOLUTION INTRAVENOUS at 18:42

## 2020-01-01 RX ADMIN — HYDROMORPHONE HYDROCHLORIDE 0.5 MG: 1 INJECTION, SOLUTION INTRAMUSCULAR; INTRAVENOUS; SUBCUTANEOUS at 19:59

## 2020-01-01 RX ADMIN — HYDROMORPHONE HYDROCHLORIDE 0.3 MG: 1 INJECTION, SOLUTION INTRAMUSCULAR; INTRAVENOUS; SUBCUTANEOUS at 07:45

## 2020-01-01 RX ADMIN — CEFOTETAN AND DEXTROSE 1 G: 1 INJECTION, SOLUTION INTRAVENOUS at 18:15

## 2020-01-01 RX ADMIN — HYDROMORPHONE HYDROCHLORIDE 0.5 MG: 1 INJECTION, SOLUTION INTRAMUSCULAR; INTRAVENOUS; SUBCUTANEOUS at 03:43

## 2020-01-01 RX ADMIN — ACETAMINOPHEN 975 MG: 325 TABLET, FILM COATED ORAL at 11:42

## 2020-01-01 RX ADMIN — CEFOTETAN AND DEXTROSE 1 G: 1 INJECTION, SOLUTION INTRAVENOUS at 19:30

## 2020-01-01 RX ADMIN — ACETAMINOPHEN 1000 MG: 10 INJECTION, SOLUTION INTRAVENOUS at 04:24

## 2020-01-01 RX ADMIN — MIDAZOLAM 2 MG: 1 INJECTION INTRAMUSCULAR; INTRAVENOUS at 17:54

## 2020-01-01 RX ADMIN — KETOROLAC TROMETHAMINE 15 MG: 15 INJECTION, SOLUTION INTRAMUSCULAR; INTRAVENOUS at 11:41

## 2020-01-01 RX ADMIN — ONDANSETRON 4 MG: 2 INJECTION INTRAMUSCULAR; INTRAVENOUS at 19:40

## 2020-01-01 RX ADMIN — IPRATROPIUM BROMIDE AND ALBUTEROL SULFATE 3 ML: .5; 3 SOLUTION RESPIRATORY (INHALATION) at 15:52

## 2020-01-01 RX ADMIN — ACETAMINOPHEN 975 MG: 325 TABLET, FILM COATED ORAL at 17:38

## 2020-01-01 RX ADMIN — ACETAMINOPHEN 1000 MG: 10 INJECTION, SOLUTION INTRAVENOUS at 22:00

## 2020-01-01 RX ADMIN — HYDROMORPHONE HYDROCHLORIDE 0.5 MG: 1 INJECTION, SOLUTION INTRAMUSCULAR; INTRAVENOUS; SUBCUTANEOUS at 09:53

## 2020-01-01 RX ADMIN — FENTANYL CITRATE 100 MCG: 50 INJECTION, SOLUTION INTRAMUSCULAR; INTRAVENOUS at 17:54

## 2020-01-01 RX ADMIN — ACETAMINOPHEN 975 MG: 325 TABLET, FILM COATED ORAL at 05:55

## 2020-01-01 RX ADMIN — ACETAMINOPHEN 975 MG: 325 TABLET, FILM COATED ORAL at 23:19

## 2020-01-01 RX ADMIN — UMECLIDINIUM 1 PUFF: 62.5 AEROSOL, POWDER ORAL at 08:11

## 2020-01-01 RX ADMIN — POTASSIUM CHLORIDE, DEXTROSE MONOHYDRATE AND SODIUM CHLORIDE: 150; 5; 900 INJECTION, SOLUTION INTRAVENOUS at 21:46

## 2020-01-01 RX ADMIN — POTASSIUM CHLORIDE, DEXTROSE MONOHYDRATE AND SODIUM CHLORIDE: 150; 5; 900 INJECTION, SOLUTION INTRAVENOUS at 17:46

## 2020-01-01 RX ADMIN — SODIUM CHLORIDE, POTASSIUM CHLORIDE, SODIUM LACTATE AND CALCIUM CHLORIDE: 600; 310; 30; 20 INJECTION, SOLUTION INTRAVENOUS at 23:32

## 2020-01-01 RX ADMIN — UMECLIDINIUM 1 PUFF: 62.5 AEROSOL, POWDER ORAL at 07:10

## 2020-01-01 RX ADMIN — HYDROMORPHONE HYDROCHLORIDE 0.5 MG: 1 INJECTION, SOLUTION INTRAMUSCULAR; INTRAVENOUS; SUBCUTANEOUS at 09:32

## 2020-01-01 RX ADMIN — IPRATROPIUM BROMIDE AND ALBUTEROL SULFATE 3 ML: .5; 3 SOLUTION RESPIRATORY (INHALATION) at 12:38

## 2020-01-01 RX ADMIN — ACETAMINOPHEN 975 MG: 325 TABLET, FILM COATED ORAL at 00:36

## 2020-01-01 RX ADMIN — ACETAMINOPHEN 975 MG: 325 TABLET, FILM COATED ORAL at 17:00

## 2020-01-01 RX ADMIN — HYDROCODONE BITARTRATE AND ACETAMINOPHEN 1 TABLET: 5; 325 TABLET ORAL at 08:47

## 2020-01-01 RX ADMIN — KETOROLAC TROMETHAMINE 15 MG: 15 INJECTION, SOLUTION INTRAMUSCULAR; INTRAVENOUS at 11:30

## 2020-01-01 RX ADMIN — ENOXAPARIN SODIUM 40 MG: 40 INJECTION SUBCUTANEOUS at 21:45

## 2020-01-01 RX ADMIN — ACETAMINOPHEN 975 MG: 325 TABLET, FILM COATED ORAL at 23:25

## 2020-01-01 RX ADMIN — POTASSIUM CHLORIDE, DEXTROSE MONOHYDRATE AND SODIUM CHLORIDE: 150; 5; 900 INJECTION, SOLUTION INTRAVENOUS at 11:44

## 2020-01-01 RX ADMIN — UMECLIDINIUM 1 PUFF: 62.5 AEROSOL, POWDER ORAL at 12:42

## 2020-01-01 RX ADMIN — SODIUM CHLORIDE 500 ML: 9 INJECTION, SOLUTION INTRAVENOUS at 17:25

## 2020-01-01 RX ADMIN — ACETAMINOPHEN 975 MG: 325 TABLET, FILM COATED ORAL at 05:01

## 2020-01-01 ASSESSMENT — ACTIVITIES OF DAILY LIVING (ADL)
ADLS_ACUITY_SCORE: 12
ADLS_ACUITY_SCORE: 10
ADLS_ACUITY_SCORE: 12
ADLS_ACUITY_SCORE: 12
ADLS_ACUITY_SCORE: 10
ADLS_ACUITY_SCORE: 12
ADLS_ACUITY_SCORE: 10
ADLS_ACUITY_SCORE: 12
ADLS_ACUITY_SCORE: 10
ADLS_ACUITY_SCORE: 12
ADLS_ACUITY_SCORE: 10
ADLS_ACUITY_SCORE: 10
ADLS_ACUITY_SCORE: 12
ADLS_ACUITY_SCORE: 10

## 2020-01-01 ASSESSMENT — PAIN SCALES - GENERAL
PAINLEVEL: SEVERE PAIN (6)
PAINLEVEL: SEVERE PAIN (6)

## 2020-01-01 ASSESSMENT — MIFFLIN-ST. JEOR
SCORE: 1419.38
SCORE: 1441.38
SCORE: 1405.38
SCORE: 1468.38
SCORE: 1416.38
SCORE: 1436.24

## 2020-01-01 ASSESSMENT — ENCOUNTER SYMPTOMS
CONSTIPATION: 0
ACTIVITY CHANGE: 0
CHILLS: 0
NAUSEA: 0
SHORTNESS OF BREATH: 0
DIARRHEA: 0
ABDOMINAL DISTENTION: 1
ABDOMINAL PAIN: 0
BACK PAIN: 1
COUGH: 1

## 2020-01-01 ASSESSMENT — LIFESTYLE VARIABLES: TOBACCO_USE: 1

## 2020-07-31 PROBLEM — K56.609 LARGE BOWEL OBSTRUCTION (H): Status: ACTIVE | Noted: 2020-01-01

## 2020-07-31 PROBLEM — K63.89 COLONIC MASS: Status: ACTIVE | Noted: 2020-01-01

## 2020-07-31 PROBLEM — K63.89 MASS OF COLON: Status: ACTIVE | Noted: 2020-01-01

## 2020-07-31 PROBLEM — K63.89 MASS OF HEPATIC FLEXURE OF COLON: Status: ACTIVE | Noted: 2020-01-01

## 2020-07-31 NOTE — DISCHARGE INSTRUCTIONS
Ostomy Care:  Supplies to order  [facility]:  #[id number] - Barrier  #[id number]-Pouch   #[id number] - skin barrier rings  #[id number] - Stoma Powder     [employer]:  Sureprep no-sting skin protective barrier wipe     Please call wound/ostomy center with any concerns or to make an appointment.   Tel: [phone]

## 2020-07-31 NOTE — ED NOTES
Covid swab sent prior to 1pm for . Dilaudid given at 1236. Report given to JULIETH Waterman and they are ready for pt. All family aware of plan to admit here for surgery.

## 2020-07-31 NOTE — ANESTHESIA PREPROCEDURE EVALUATION
Anesthesia Pre-Procedure Evaluation    Patient: Rudy Stevenson   MRN: [id number] : 1950          Preoperative Diagnosis: Colonic mass [K63.89]    Procedure(s):  Exploratory laparotomy, right hemicolectomy, and ileostomy creation    No past medical history on file.  Past Surgical History:   Procedure Laterality Date     EXCISE LESION LIP N/A 2014    Procedure: EXCISE LESION LIP;  Surgeon: Nahum Hayden DO;  Location: [facility]       Anesthesia Evaluation     . Pt has had prior anesthetic. Type: MAC    No history of anesthetic complications          ROS/MED HX    ENT/Pulmonary: Comment: bilat pleural effusions    (+)tobacco use, Current use , . .    Neurologic:  - neg neurologic ROS     Cardiovascular:  - neg cardiovascular ROS       METS/Exercise Tolerance:     Hematologic:  - neg hematologic  ROS       Musculoskeletal:   (+) arthritis,  -       GI/Hepatic: Comment: Obstructing colonic mass        Renal/Genitourinary:  - ROS Renal section negative       Endo:  - neg endo ROS       Psychiatric:  - neg psychiatric ROS       Infectious Disease:  - neg infectious disease ROS       Malignancy:      - no malignancy   Other:                          Physical Exam  Normal systems: cardiovascular    Airway   Mallampati: II  TM distance: >3 FB  Neck ROM: full    Dental   (+) upper dentures    Cardiovascular   Rhythm and rate: regular and normal      Pulmonary (+) rales     PE comment: Crackles lower            Lab Results   Component Value Date    WBC 9.9 2020    HGB 13.2 (L) 2020    HCT 36.7 (L) 2020     2020     (L) 2020    POTASSIUM 4.5 2020    CHLORIDE 86 (L) 2020    CO2 28 2020    BUN 11 2020    CR 0.55 (L) 2020     (H) 2020    MAGDALENO 8.5 2020    ALBUMIN 2.1 (L) 2020    PROTTOTAL 6.5 (L) 2020    ALT 27 2020     (H) 2020    ALKPHOS 214 (H) 2020    BILITOTAL 0.6 2020    LIPASE  "41 (L) 07/31/2020    PTT 32 11/24/2014    INR 1.31 (H) 07/31/2020       Preop Vitals  BP Readings from Last 3 Encounters:   07/31/20 125/83   12/09/14 128/72   12/02/14 115/73    Pulse Readings from Last 3 Encounters:   07/31/20 112   12/09/14 84   11/28/14 72      Resp Readings from Last 3 Encounters:   07/31/20 16   12/09/14 16   12/02/14 18    SpO2 Readings from Last 3 Encounters:   07/31/20 96%   12/02/14 95%   11/24/14 96%      Temp Readings from Last 1 Encounters:   07/31/20 98.2  F (36.8  C) (Tympanic)    Ht Readings from Last 1 Encounters:   07/31/20 1.753 m (5' 9\")      Wt Readings from Last 1 Encounters:   07/31/20 65.6 kg (144 lb 10 oz)    Estimated body mass index is 21.36 kg/m  as calculated from the following:    Height as of this encounter: 1.753 m (5' 9\").    Weight as of this encounter: 65.6 kg (144 lb 10 oz).       Anesthesia Plan      History & Physical Review  History and physical reviewed and following examination; no interval change.    ASA Status:  3 .    NPO Status:  > 8 hours    Plan for General with Intravenous and Propofol induction. Maintenance will be Balanced.    PONV prophylaxis:  Ondansetron (or other 5HT-3) and Dexamethasone or Solumedrol  H and p 7-31-20, covid test negative        Postoperative Care  Postoperative pain management:  IV analgesics.      Consents  Anesthetic plan, risks, benefits and alternatives discussed with:  Patient.  Use of blood products discussed: No .   .                 FRANK Simpson CRNA  "

## 2020-07-31 NOTE — ED NOTES
Med surg charge rn updated on pt status. She will call back with time for report and bed number after speaking with house supervisor. Pt sleeping in bed and pain under control.

## 2020-07-31 NOTE — PLAN OF CARE
[facility] Inpatient Admission Note:    Patient admitted to 3210/3210-1 at approximately 1330 via wheel chair accompanied by transport tech from emergency room . Report received from NICO Hussein in SBAR format at 1250 via telephone. Patient transferred to bed via self.. Patient is alert and oriented X 3, denies pain; rates at 0 on 0-10 scale.  Patient oriented to room, unit, hourly rounding, and plan of care. Explained admission packet and patient handbook with patient bill of rights brochure. Will continue to monitor and document as needed.     Inpatient Nursing criteria listed below was met:    Health care directives status obtained and documented: Yes    Care Everywhere authorization obtained Yes    MRSA swab completed for patient 65 years and older: N/A     If initial lactic acid >2.0, repeat lactic acid drawn within one hour of arrival to unit: NA. If no, state reason: NA    Vaccination assessment and education completed: Yes   Vaccinations received prior to admission: Pneumovax yes  Influenza(seasonal)  N/A   Vaccination(s) ordered: patient declines    Clergy visit ordered if patient requests: N/A    Skin issues/needs documented: N/A    Isolation Patient: no Education given, correct sign in place and documentation row added to PCS:  No    Fall Prevention N/A: Care plan updated, education given and documented, sticker and magnet in place: No and N/A    Care Plan initiated: Yes    Education Documented (including assessment): Yes    Patient has discharge needs : No If yes, please explain:NA

## 2020-07-31 NOTE — ED PROVIDER NOTES
History     Chief Complaint   Patient presents with     Back Pain     lower and bilateral . would like labs and urine sample obtained. no dysuria. has seen the chiro x2 in the past 2-3 weeks. denies injury or hx of kidsney stones.      HPI  Rudy Stevenson is a 69 year old male who presents to the ER with a complaint of lower back pain and chest congestion.  In regards to his back pain he states he has been following up with his chiropractor with 2 visits over the last 2 weeks and they feel as if his back is improving.  Patient motions to his lower back with the pain radiating into his pelvis bilaterally.  Patient denies experiencing a distended abdomen denies experiencing diarrhea or constipation admits his appetite is been less than normal.  In regards to patient second complaint of chest congestion he has been trying to manage it with over-the-counter antitussives but has not found no relief.  He denies exertional dyspnea has not experienced fevers or chills he has no known COVID exposures.    Allergies:  No Known Allergies    Problem List:    There are no active problems to display for this patient.       Past Medical History:    No past medical history on file.    Past Surgical History:    Past Surgical History:   Procedure Laterality Date     EXCISE LESION LIP N/A 12/2/2014    Procedure: EXCISE LESION LIP;  Surgeon: Nahum Hayden DO;  Location: [facility] OR       Family History:    No family history on file.    Social History:  Marital Status:   [2]  Social History     Tobacco Use     Smoking status: Current Every Day Smoker     Packs/day: 0.50     Years: 4.00     Pack years: 2.00     Smokeless tobacco: Never Used   Substance Use Topics     Alcohol use: Yes     Drug use: No        Medications:    No current outpatient medications on file.        Review of Systems   Constitutional: Negative for activity change and chills.   Respiratory: Positive for cough. Negative for shortness of breath.     Gastrointestinal: Positive for abdominal distention. Negative for abdominal pain, constipation, diarrhea and nausea.   Musculoskeletal: Positive for back pain.   All other systems reviewed and are negative.      Physical Exam   BP: 137/75  Pulse: 102  Heart Rate: 102  Temp: 97.7  F (36.5  C)  Resp: 20  SpO2: 100 %      Physical Exam  Constitutional:       Appearance: Normal appearance.      Comments: disheveled    HENT:      Head: Normocephalic.      Nose: Nose normal.      Mouth/Throat:      Mouth: Mucous membranes are dry.   Eyes:      Pupils: Pupils are equal, round, and reactive to light.   Neck:      Musculoskeletal: Normal range of motion.   Cardiovascular:      Rate and Rhythm: Normal rate.      Pulses: Normal pulses.   Pulmonary:      Comments: Wheezing In bilateral lung fields R>L  Abdominal:      General: There is distension.      Palpations: There is no mass.      Tenderness: There is no guarding.      Hernia: No hernia is present.   Musculoskeletal: Normal range of motion.      Comments: Midline lower back pain    Skin:     Capillary Refill: Capillary refill takes less than 2 seconds.   Neurological:      General: No focal deficit present.      Mental Status: He is alert. Mental status is at baseline.      Motor: No weakness.   Psychiatric:         Mood and Affect: Mood normal.         ED Course     ED Course as of Jul 31 1040   Fri Jul 31, 2020   1016 Surgery has been consulted regarding CT findings          Procedures                   Results for orders placed or performed during the hospital encounter of 07/31/20 (from the past 24 hour(s))   Routine UA with microscopic   Result Value Ref Range    Color Urine Yellow     Appearance Urine Clear     Glucose Urine Negative NEG^Negative mg/dL    Bilirubin Urine Negative NEG^Negative    Ketones Urine Negative NEG^Negative mg/dL    Specific Gravity Urine 1.016 1.003 - 1.035    Blood Urine Negative NEG^Negative    pH Urine 6.0 4.7 - 8.0 pH    Protein  Albumin Urine Negative NEG^Negative mg/dL    Urobilinogen mg/dL 4.0 (H) 0.0 - 2.0 mg/dL    Nitrite Urine Negative NEG^Negative    Leukocyte Esterase Urine Negative NEG^Negative    Source Nasopharyngeal     WBC Urine 1 0 - 5 /HPF    RBC Urine 1 0 - 2 /HPF    Bacteria Urine None (A) NEG^Negative /HPF    Mucous Urine Present (A) NEG^Negative /LPF    Hyaline Casts 1 (A) OTO2^O - 2 /LPF   CBC with platelets differential   Result Value Ref Range    WBC 9.9 4.0 - 11.0 10e9/L    RBC Count 4.13 (L) 4.4 - 5.9 10e12/L    Hemoglobin 13.2 (L) 13.3 - 17.7 g/dL    Hematocrit 36.7 (L) 40.0 - 53.0 %    MCV 89 78 - 100 fl    MCH 32.0 26.5 - 33.0 pg    MCHC 36.0 31.5 - 36.5 g/dL    RDW 12.1 10.0 - 15.0 %    Platelet Count 295 150 - 450 10e9/L    Diff Method Manual Differential     % Neutrophils 84.0 %    % Lymphocytes 9.0 %    % Monocytes 4.0 %    % Eosinophils 1.0 %    % Basophils 0.0 %    % Myelocytes 2.0 %    Absolute Neutrophil 8.3 1.6 - 8.3 10e9/L    Absolute Lymphocytes 0.9 0.8 - 5.3 10e9/L    Absolute Monocytes 0.4 0.0 - 1.3 10e9/L    Absolute Eosinophils 0.1 0.0 - 0.7 10e9/L    Absolute Basophils 0.0 0.0 - 0.2 10e9/L    Absolute Myelocytes 0.2 (H) 0 10e9/L    RBC Morphology Consistent with reported results     Platelet Estimate       Automated count confirmed.  Platelet morphology is normal.   Basic metabolic panel   Result Value Ref Range    Sodium 122 (L) 133 - 144 mmol/L    Potassium 4.5 3.4 - 5.3 mmol/L    Chloride 86 (L) 94 - 109 mmol/L    Carbon Dioxide 28 20 - 32 mmol/L    Anion Gap 8 3 - 14 mmol/L    Glucose 104 (H) 70 - 99 mg/dL    Urea Nitrogen 11 7 - 30 mg/dL    Creatinine 0.55 (L) 0.66 - 1.25 mg/dL    GFR Estimate >90 >60 mL/min/[1.73_m2]    GFR Estimate If Black >90 >60 mL/min/[1.73_m2]    Calcium 8.5 8.5 - 10.1 mg/dL   Hepatic panel   Result Value Ref Range    Bilirubin Direct 0.1 0.0 - 0.2 mg/dL    Bilirubin Total 0.6 0.2 - 1.3 mg/dL    Albumin 2.1 (L) 3.4 - 5.0 g/dL    Protein Total 6.5 (L) 6.8 - 8.8 g/dL     Alkaline Phosphatase 214 (H) 40 - 150 U/L    ALT 27 0 - 70 U/L     (H) 0 - 45 U/L   Lipase   Result Value Ref Range    Lipase 41 (L) 73 - 393 U/L   Procalcitonin   Result Value Ref Range    Procalcitonin 0.25 ng/ml   XR Chest Port 1 View    Narrative    PROCEDURE: XR CHEST PORT 1 VW 7/31/2020 9:21 AM    HISTORY: eval for sob    COMPARISONS: None.    TECHNIQUE: Single view.    FINDINGS: Heart is enlarged. There is linear probable atelectasis at  the left lung base. Right lung and pleural spaces are clear.    There is a fracture through the left posterolateral ninth rib. No  pneumothorax is seen.    There is some lucency seen through the heart. This is consistent with  a hernia.         Impression    IMPRESSION: Left rib fracture. No pneumothorax. Linear atelectasis  left lung base.    JORDANA RODAS MD   CT Abdomen Pelvis w Contrast    Narrative    CT ABDOMEN PELVIS W CONTRAST    CLINICAL HISTORY: Male, age 69 years,  Abd pain, gastroenteritis or  colitis suspected;    Comparison:  None.    TECHNIQUE:  CT was performed of the abdomen and pelvis with IV  contrast. Sagittal, coronal and axial reconstructions were reviewed.     FINDINGS:    Abdomen/Pelvis CT:  Lung Bases:  Peripheral and dependent atelectasis seen in the lung  bases. Small bilateral pleural effusions. Large hiatal hernia.    Esophagus/stomach: Large hiatal hernia. The majority the stomach  appears to be displaced into the thoracic cavity.    Liver:  Numerous low dense lesions, concerning for metastatic disease.  The largest mass measures approximately 4.7 cm in all 3 dimensions and  is located anteriorly in the right lobe, segment 8.    Portal venous system: Patent.  Gallbladder: Small volume of pericholecystic fluid which is appears to  be related to changes elsewhere in the peritoneal cavity.    Spleen: Numerous granulomata throughout the spleen. No acute  abnormality.    Pancreas: Normal. The contour of the pancreas is altered by  shifting  anatomy secondary to displacement of soft tissues into the thoracic  cavity via the hiatal hernia.    Adrenal Glands: Normal.    Kidneys: Normal.  Ureters: Normal.  Urinary bladder: Mild wall thickening of the urinary bladder.    Abdominal Aorta: Scattered atherosclerotic calcifications.  IVC: Normal.    Lymph Nodes: Abnormal. Soft tissue nodule suggesting enlarged lymph  nodes are seen within the peritoneal/mesenteric fat of the right  hemiabdomen.    Large and Small Bowel: There is a circumferential mass within the  hepatic flexure the colon, likely representing neoplasm. This  circumferential mass obstructs the GI tract. More distally, the colon  demonstrates more normal contour.    Appendix: Fluid-filled, moderately distended.    Pelvic Organs:  Moderate prostate hypertrophy.  Peritoneum: Moderate volume of free fluid extends into the lower  pelvis.    Bony structures: Schmorl's nodes throughout the lumbar spine and  visualized portions of the thoracic spine, most evident along the  inferior endplate of L4. No distinct evidence of destructive lesion.    Other Findings:  Large left inguinal hernia contains a loop of  nondistended colon, mesenteric fat/peritoneal fat and small volume of  fluid.      Impression    IMPRESSION:   Circumferential mass of the hepatic flexure likely representing  malignancy with metastatic disease to the liver and  intraperitoneal/mesenteric lymph nodes.     Distal obstruction secondary to the circumferential mass.    Large hiatal hernia without distinct evidence of acute associated  abnormality.    Large left inguinal hernia contains a loop of nondistended colon  without acute abnormality.    ALESHIA REGAN MD       Medications   HYDROcodone-acetaminophen (NORCO) 5-325 MG per tablet 1 tablet (1 tablet Oral Given 7/31/20 0847)   sodium chloride (PF) 0.9% PF flush 60 mL (60 mLs Intravenous Given 7/31/20 0938)   iopamidol (ISOVUE-300) IV solution 61% 100 mL (100 mLs  "Intravenous Given 7/31/20 0954)   HYDROmorphone (PF) (DILAUDID) injection 0.5 mg (0.5 mg Intravenous Given 7/31/20 1026)       Assessments & Plan (with Medical Decision Making)  Patient presents to the ER with complaints of chest congestion lower back pain exam is notable for abdominal distention which he attributes to being a \"beer  Belly.\"  Patient's labs are concerning for severe hyponatremia with a sodium of 122.  Regards to his complaints of chest congestion x-ray of the chest was obtained revealed cardiomegaly and an incidental rib fracture which is secondary to a fall at the end of last year.  Given the abdominal distention is compelled to get a CT scan of the abdomen and pelvis.  This study revealed concerning findings specifically evidence of a distal obstruction secondary to a new mass at the hepatic flexure.  In addition he has a fluid-filled distended appendix.  I have consulted with on-call surgery who will evaluate patient at bedside with the anticipation of patient being admitted to the medicine service or possibly going directly to the OR if deemed appropriate.  He and wife have been informed regarding the concerning CT findings.  Dx  1. Obstruction secondary to Malignant Mass (new dx)  2. Hyponatremia  3. Back Pain     I have reviewed the nursing notes.    I have reviewed the findings, diagnosis, plan and need for follow up with the patient.      New Prescriptions    No medications on file       Final diagnoses:   None       7/31/2020   [facility]     Rm Alaniz MD  07/31/20 1043       Rm Alaniz MD  07/31/20 1136    "

## 2020-07-31 NOTE — ED NOTES
Pt ambulated to the bathroom independently. And is now resting in the bed connected to the monitors.

## 2020-07-31 NOTE — H&P
[facility]    History and Physical  Hospitalist       Date of Admission:  7/31/2020  Date of Service (when I saw the patient): 07/31/20    Assessment & Plan   Rudy Stevenson is a 69 year old man who presented to emergency department today because of lower back pain radiating to the pelvis.  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  Imaging also shows bilateral pleural effusions, and incidentally noted left ninth rib fracture which appears to be acute. He had noted abdominal distention for up to several months which he attributed to his beer drinking, pain as above and had more tense abdomen particularly over the past several weeks.  His pain is been most marked for him over the past 3-4 days.  For the last several weeks because of his pain he has been using ibuprofen up to every 4 hours in notes that he has been drinking more water because of his increased use of this medicine.  Other evaluation in the emergency department relatively unrevealing with the exception of hyponatremia.  He was admitted with plans for urgent operative relief of his colon obstruction.    1.  Colon obstruction  Almost certainly malignant. Even if relief of obstruction is palliative alone this would be indicated.  Benefit of semi-urgent procedure exceeds risks likely to be moderate in degree.  He will require further evaluation initially at operation before determination of what treatment for his probable malignancy might be warranted.  2.  Chronic airflow obstruction  He is a daily smoker currently 3-4 cigars daily.  Particularly for the past 3-4 weeks he has had increasing chest congestion digestion, difficulty in clearing secretions poorly responsive to over-the-counter antitussives.  He does not, however, have resting dyspnea.  Nevertheless he may have continued issues with secretion clearance and possibly deferring extubation to be done in ICU  postoperatively rather than immediately following the procedure would be reasonable.  No indication for systemic steroids.  Usual bronchodilators would be reasonable.  If possible metered-dose inhalers should be considered to minimize aerosol generation.  3.  Hyponatremia  He has had increased use of hypotonic fluids especially water over the past several weeks while he is taking relatively high doses of ibuprofen.  Would be hopeful that this is the source of his hyponatremia.  Use of ibuprofen itself may also contribute.  For the time being minimize free water with anticipation of improvement on this basis alone.  In other respects renal function appears relatively well-preserved.  4.  Ethanol use  Regular use of ethanol.  1/4 on Cage questionnaire.  Likelihood of significant acute complications relatively low.  Will need usual surveillance for the possibility of withdrawal although this is not a high probability.  5.  Chronic nicotine use  3-4 cigars daily.  May contribute to his difficulties in secretion clearance as above.  Nicotine replacement as indicated depending on his course.  Moderate    Moderate risk of proposed procedure.  However benefit exceeds risk, and risk of delay exceeds any benefit.        DVT Prophylaxis: Intermittent pneumatic compression at present  Code Status: Full Code; discussed with him issues of resuscitation and life support perioperatively.  This will need further discussion once he is further out from his operative procedure.    Disposition: Expected discharge difficult to predict at the present time.  At least a number of days of in-hospital treatment and indeed several days of critical care management are likely to be required.    Yoni Tse    Primary Care Physician   Abilio Valladares    Chief Complaint   Lower back pain; obstructing colon mass noted on initial emergency department evaluation    History is obtained from the patient    History of Present Illness   Rudy NAPOLES  Graham is a 69 year old man who present 3-4 days of more severe lower back pain radiating to pelvis.  Ondiscussion with him and his reflection a longer period of time of progressive lower back pain.  He is been evaluated by a chiropractor with some although not significant improvement.  For the past 3-4 weeks he is taking increasingly frequent doses of ibuprofen up to 800 mg perhaps every 4 hours.  Because of this he has been drinking more water.  Evaluation in emergency department showed a hyponatremia as well as an obstructing colon mass at the hepatic flexure.  Evaluation also showed bilateral pleural effusions, hiatal hernia with most of stomach in thoracic cavity and a left ninth rib, posterior lateral fracture which appears to be acute.  He does recall a somewhat remote injury although he is not certain whether this involved injury to his chest.  Over the past 3-4 weeks he has had increasing cough and difficulty and clearing secretions which may be the cause of this fracture.  No pneumothorax.  He notes that his wife  approximately 8 months ago and for at least a period of time he did not pay much attention to his symptoms or his care.  He is noted some abdominal distention over a number of months which she attributed to his beer consumption.  Subsequently over the past 4 weeks to perhaps as long as several months he has had increasing lower back pain as well as more tense abdomen.  His pain became more acute for him, however, over the past 3-4 days.  He has had no resting dyspnea.  No hemoptysis.  He is not noted any change in his bowel habits.  No melenic stool or blood in his stool.  He has not had any urinary symptoms.  He has noted weight loss over the past 3-4 months.    Past Medical History    I have reviewed this patient's medical history and updated it with pertinent information if needed.   Little contact with medical care.  Most of his care is been episodic and is not aware of any other  significant medical issues.  Past Surgical History   I have reviewed this patient's surgical history and updated it with pertinent information if needed.  Past Surgical History:   Procedure Laterality Date     EXCISE LESION LIP N/A 12/2/2014    Procedure: EXCISE LESION LIP;  Surgeon: Nahum Hayden DO;  Location: [facility] OR       Prior to Admission Medications   None     Allergies   No Known Allergies    Social History   I have reviewed this patient's social history and updated it with pertinent information if needed. Rudy Stevenson  reports that he has been smoking. He has a 3-4 cigars daily. He has never used smokeless tobacco.  3-4 beers daily.  Cage questionnaire 1/4 no hospitalization for alcohol consumption alone. He reports that he does not use drugs.    Family History   I have reviewed this patient's family history and updated it with pertinent information if needed.   Multiple family members with various cancers.  He does not recall details.    Review of Systems   The 10 point Review of Systems is negative other than noted in the HPI.    Physical Exam   Temp: 98.2  F (36.8  C) Temp src: Tympanic BP: 125/83 Pulse: 112 Heart Rate: 112 Resp: 16 SpO2: 96 % O2 Device: None (Room air)    Vital Signs with Ranges  Temp:  [97.7  F (36.5  C)-98.2  F (36.8  C)] 98.2  F (36.8  C)  Pulse:  [] 112  Heart Rate:  [102-112] 112  Resp:  [16-20] 16  BP: (112-143)/(69-93) 125/83  SpO2:  [92 %-100 %] 96 %  144 lbs 9.95 oz      Awake, alert, asthenic man sitting on bed on medical wards.  Speech is clear.  Pleasant and interactive.  HEENT: Pupils equal, conjugate. No icterus or nystagmus. Oral mucosa moist. No facial asymmetry.   Neck: Supple, jugular veins 1 cm water. Trachea midline   Chest: No chest wall movement asymmetry. Aeration globally mildly diminished. Accessory muscles not in use. Expiratory time not increased. No tidal wheezes.  Scattered sonorous rhonchi.  Scattered mid inspiratory crackles.   Cardiac: PMI  not displaced. S1, S2 unremarkable. No S3, S4. P2 not accentuated. No murmurs.    Abdomen: Soft.  Tense mildly distended abdomen.  Minimal palpation tenderness.  No percussion tenderness.  Rare bowel sounds.  No masses appreciated..   Extremities: No lower extremity edema.  Warm distally.  No eccymoses, clubbing.   Neurologic: Mental state above. Motor 5/5 and bilaterally equal. Tone preserved. No fasiculations or tremors. Sensation intact to light touch. DTR 2/4 and bilaterally equal.   Data   Data reviewed today:  I personally reviewed Chest radiograph with findings as above.  Bilateral small pleural effusions.  Left ninth rib fracture apparent acute.  CT of abdomen demonstrates obstructing colon mass at hepatic flexure.  Moderate amount of small bowel distention.  Hepatic masses likely representing malignancy.    Recent Labs   Lab 07/31/20  0856   WBC 9.9   HGB 13.2*   MCV 89      INR 1.31*   *   POTASSIUM 4.5   CHLORIDE 86*   CO2 28   BUN 11   CR 0.55*   ANIONGAP 8   MAGDALENO 8.5   *   ALBUMIN 2.1*   PROTTOTAL 6.5*   BILITOTAL 0.6   ALKPHOS 214*   ALT 27   *   LIPASE 41*            Recent Results (from the past 24 hour(s))   XR Chest Port 1 View    Narrative    PROCEDURE: XR CHEST PORT 1 VW 7/31/2020 9:21 AM    HISTORY: eval for sob    COMPARISONS: None.    TECHNIQUE: Single view.    FINDINGS: Heart is enlarged. There is linear probable atelectasis at  the left lung base. Right lung and pleural spaces are clear.    There is a fracture through the left posterolateral ninth rib. No  pneumothorax is seen.    There is some lucency seen through the heart. This is consistent with  a hernia.         Impression    IMPRESSION: Left rib fracture. No pneumothorax. Linear atelectasis  left lung base.    JORDANA RODAS MD   CT Abdomen Pelvis w Contrast    Narrative    CT ABDOMEN PELVIS W CONTRAST    CLINICAL HISTORY: Male, age 69 years,  Abd pain, gastroenteritis or  colitis suspected;    Comparison:   None.    TECHNIQUE:  CT was performed of the abdomen and pelvis with IV  contrast. Sagittal, coronal and axial reconstructions were reviewed.     FINDINGS:    Abdomen/Pelvis CT:  Lung Bases:  Peripheral and dependent atelectasis seen in the lung  bases. Small bilateral pleural effusions. Large hiatal hernia.    Esophagus/stomach: Large hiatal hernia. The majority the stomach  appears to be displaced into the thoracic cavity.    Liver:  Numerous low dense lesions, concerning for metastatic disease.  The largest mass measures approximately 4.7 cm in all 3 dimensions and  is located anteriorly in the right lobe, segment 8.    Portal venous system: Patent.  Gallbladder: Small volume of pericholecystic fluid which is appears to  be related to changes elsewhere in the peritoneal cavity.    Spleen: Numerous granulomata throughout the spleen. No acute  abnormality.    Pancreas: Normal. The contour of the pancreas is altered by shifting  anatomy secondary to displacement of soft tissues into the thoracic  cavity via the hiatal hernia.    Adrenal Glands: Normal.    Kidneys: Normal.  Ureters: Normal.  Urinary bladder: Mild wall thickening of the urinary bladder.    Abdominal Aorta: Scattered atherosclerotic calcifications.  IVC: Normal.    Lymph Nodes: Abnormal. Soft tissue nodule suggesting enlarged lymph  nodes are seen within the peritoneal/mesenteric fat of the right  hemiabdomen.    Large and Small Bowel: There is a circumferential mass within the  hepatic flexure the colon, likely representing neoplasm. This  circumferential mass obstructs the GI tract. More distally, the colon  demonstrates more normal contour.    Appendix: Fluid-filled, moderately distended.    Pelvic Organs:  Moderate prostate hypertrophy.  Peritoneum: Moderate volume of free fluid extends into the lower  pelvis.    Bony structures: Schmorl's nodes throughout the lumbar spine and  visualized portions of the thoracic spine, most evident along  the  inferior endplate of L4. No distinct evidence of destructive lesion.    Other Findings:  Large left inguinal hernia contains a loop of  nondistended colon, mesenteric fat/peritoneal fat and small volume of  fluid.      Impression    IMPRESSION:   Circumferential mass of the hepatic flexure likely representing  malignancy with metastatic disease to the liver and  intraperitoneal/mesenteric lymph nodes.     Distal obstruction secondary to the circumferential mass.    Large hiatal hernia without distinct evidence of acute associated  abnormality.    Large left inguinal hernia contains a loop of nondistended colon  without acute abnormality.    ALESHIA REGAN MD

## 2020-07-31 NOTE — CONSULTS
GENERAL SURGERY CONSULTATION NOTE    Rudy Stevenson  [address] [facility] [id number]  69 year old male    Primary Care Provider:  None    CC: Abdominal pain    HPI: I was asked to see this patient by Dr. Lucas for evaluation of an obstructing colon mass. Patient reports a 3-4 day history of abdominal distension and pain. He has not had this before. He denies significant changes in bowel habits. He has not had blood in his stools. He does not seek routine medical care. He has never had a colonoscopy.    REVIEW OF SYSTEMS:  A 10 point ROS was performed and was negative except per the HPI.    Patient Active Problem List    Diagnosis Date Noted     Mass of colon 2020     Priority: Medium     PMH:  None    Past Surgical History:   Procedure Laterality Date     EXCISE LESION LIP N/A 2014    Procedure: EXCISE LESION LIP;  Surgeon: Nahum Hayden DO;  Location: [facility] OR       Family history:  Patient has significant family history of cancer. He has had multiple siblings and his mother all  of unknown cancer. No known family history of anesthesia or bleeding problems.      Social History     Social History Narrative     Not on file       ALLERGIES/SENSITIVITIES:  No Known Allergies      PHYSICAL EXAM:   /92   Pulse 101   Temp 97.7  F (36.5  C)   Resp 20   SpO2 92%   There is no height or weight on file to calculate BMI.  General Appearance:  Thin male, pleasant and in no acute distress  Eyes:  No jaundice.  Neck:  Trachea midline.  Chest:  No dyspnea.  Equal chest wall expansion.  Heart:  RRR.  Abdomen:  Distended but soft, tender in RLQ, no peritoneal signs.  Integumentary:  Warm and well perfused.   Neuro:  No apparent focal defects.  Psyche:  Awake and appropriate.  Capable of informed consent.  Extremities:  No edema or tenderness.    IMAGING:  Xr Chest Port 1 View    Result Date: 2020  PROCEDURE: XR CHEST PORT 1 VW 2020 9:21 AM HISTORY: eval for sob COMPARISONS: None.  TECHNIQUE: Single view. FINDINGS: Heart is enlarged. There is linear probable atelectasis at the left lung base. Right lung and pleural spaces are clear. There is a fracture through the left posterolateral ninth rib. No pneumothorax is seen. There is some lucency seen through the heart. This is consistent with a hernia.        IMPRESSION: Left rib fracture. No pneumothorax. Linear atelectasis left lung base. JORDANA RODAS MD    Ct Abdomen Pelvis W Contrast    Result Date: 7/31/2020  CT ABDOMEN PELVIS W CONTRAST CLINICAL HISTORY: Male, age 69 years,  Abd pain, gastroenteritis or colitis suspected; Comparison:  None. TECHNIQUE:  CT was performed of the abdomen and pelvis with IV contrast. Sagittal, coronal and axial reconstructions were reviewed. FINDINGS: Abdomen/Pelvis CT: Lung Bases:  Peripheral and dependent atelectasis seen in the lung bases. Small bilateral pleural effusions. Large hiatal hernia. Esophagus/stomach: Large hiatal hernia. The majority the stomach appears to be displaced into the thoracic cavity. Liver:  Numerous low dense lesions, concerning for metastatic disease. The largest mass measures approximately 4.7 cm in all 3 dimensions and is located anteriorly in the right lobe, segment 8. Portal venous system: Patent. Gallbladder: Small volume of pericholecystic fluid which is appears to be related to changes elsewhere in the peritoneal cavity. Spleen: Numerous granulomata throughout the spleen. No acute abnormality. Pancreas: Normal. The contour of the pancreas is altered by shifting anatomy secondary to displacement of soft tissues into the thoracic cavity via the hiatal hernia. Adrenal Glands: Normal. Kidneys: Normal. Ureters: Normal. Urinary bladder: Mild wall thickening of the urinary bladder. Abdominal Aorta: Scattered atherosclerotic calcifications. IVC: Normal. Lymph Nodes: Abnormal. Soft tissue nodule suggesting enlarged lymph nodes are seen within the peritoneal/mesenteric fat of the  right hemiabdomen. Large and Small Bowel: There is a circumferential mass within the hepatic flexure the colon, likely representing neoplasm. This circumferential mass obstructs the GI tract. More distally, the colon demonstrates more normal contour. Appendix: Fluid-filled, moderately distended. Pelvic Organs:  Moderate prostate hypertrophy. Peritoneum: Moderate volume of free fluid extends into the lower pelvis. Bony structures: Schmorl's nodes throughout the lumbar spine and visualized portions of the thoracic spine, most evident along the inferior endplate of L4. No distinct evidence of destructive lesion. Other Findings:  Large left inguinal hernia contains a loop of nondistended colon, mesenteric fat/peritoneal fat and small volume of fluid.     IMPRESSION:  Circumferential mass of the hepatic flexure likely representing malignancy with metastatic disease to the liver and intraperitoneal/mesenteric lymph nodes. Distal obstruction secondary to the circumferential mass. Large hiatal hernia without distinct evidence of acute associated abnormality. Large left inguinal hernia contains a loop of nondistended colon without acute abnormality. ALESHIA REGAN MD    I personally reviewed this imaging    LABS:  NA- 122  K- 4.5  Cl- 86  CO2- 28  BUN- 11  CR- 0.55  Albumin- 2.1  Protein total- 6.5  Bilirubin 0.1  Lipase 41    CONSULTATION ASSESSMENT AND PLAN:  Patient is a pleasant 69 year old male who presents with an obstructing colon mass. We discussed that given the findings of surrounding nodes and nodules as well was liver lesions, this is concerning for a likely malignant, obstructing, metastatic, colon cancer. We discussed this diagnosis in detail. We discussed operative and non-operative approaches. We discussed that if this is cancer, it would not be considered curable. We discussed hospice care versus surgical treatment of his obstruction. Patient would like proceed with surgery. We discussed the goal of  surgery is to relieve his obstruction and allow him to eat again. This is not a curative operation. I discussed that given his obstruction and malnutrition, I would recommend against an anastomosis as his risk of leak is very high and this could delay chemotherapy, if cancer is confirmed. I recommended proceeding with exploratory laparotomy, right hemicolectomy, and ileostomy creation. We discussed this his cancer may be to advanced and invading other structures and may not be resectable. If this is the case, we will proceed with ileostomy only as his ileocecal valve appears incompetent based on his degree of small bowel dilation. We discussed risks including, but not limited to, bleeding, infection, pain, numbness, poor wound healing, hernia, evisceration, need for additional surgeries or procedures, injury to major abdominal structures including bowel, ureter, and major blood vessels. We discussed the possibility of multiple procedures or surgeries to get through this episode. We discussed the possibility of a prolonged hospitalization. We also discussed anesthetic risks of heart attack, stroke, pneumonia, blood clots, and even death. We discussed the option of transfer to [place] to a higher level of care and patient would prefer to stay in [place] for his care. There is also a risk of COVID exposure while being hospitalized. After our discussion, patient had all of his questions answered, he expressed understanding, and he wished to proceed with surgery. Patient also states he is full code and is agreeable to a life-saving blood transfusion, if necessary.    Celso Sutherland MD on 7/31/2020 at 1:07 PM

## 2020-07-31 NOTE — ED NOTES
Pt changed mind and wants to stay here and be admitted to hospital after talking with surgeon for second time.

## 2020-07-31 NOTE — ED NOTES
Pt requesting to be transferred to [facility] for surgery. 3rd floor charge updated and bed request cancelled.

## 2020-07-31 NOTE — PROGRESS NOTES
S:  Per Dr. Sutherland's (gen surgery) consult:   CC: Abdominal pain     HPI: I was asked to see this patient by Dr. Lucas for evaluation of an obstructing colon mass. Patient reports a 3-4 day history of abdominal distension and pain. He has not had this before. He denies significant changes in bowel habits. He has not had blood in his stools. He does not seek routine medical care. He has never had a colonoscopy.    Dr. Sutherland called [facility] for an ileostomy marking.      Per Bill, stomach pain has been on and off for the past 3 weeks. Has gotten worse the past few days.   Denies any changes with his bowel patterns and/or appearance    Kalie ACUÑA RN CWOCN also here to confirm site marking.      O:  Constitutional: alert and mild distress  Gastrointestinal: distended but soft  Musculoskeletal: extremities normal- no gross deformities noted  Skin: no suspicious lesions or rashes.    Area of marking:  Cleansed with alcohol first. Placed new dot with surgical marking pen and covered with tegaderm.   Psychiatric: mentation appears normal and affect normal/bright      A/P:  1.  Ileostomy site marking    Agreed with Dr. Sutherland's marking.   After sitting, standing and bending, we decided to move the dot 1 cm to the right so the pouching device doesn't cover the umbilicus.     For now, patient denies any questions.      [facility] will follow up with patient per consult on Monday.      Colette Amin APRN [facility]  Diabetes and Wound Care

## 2020-08-01 NOTE — ANESTHESIA POSTPROCEDURE EVALUATION
Patient: Rudy Stevenson    Procedure(s):  Exploratory laparotomy, and Loop ileostomy creation, Omentum Biopsy    Diagnosis:Colonic mass [K63.89]  Diagnosis Additional Information: No value filed.    Anesthesia Type:  General    Note:  Anesthesia Post Evaluation    Patient location during evaluation: Bedside  Patient participation: Able to fully participate in evaluation  Level of consciousness: awake and lethargic  Pain management: adequate  Cardiovascular status: acceptable and stable  Respiratory status: acceptable and face mask  Hydration status: acceptable  PONV: none     Anesthetic complications: None          Last vitals:  Vitals:    07/31/20 2025 07/31/20 2030 07/31/20 2035   BP: 129/82 129/81    Pulse: 94 93    Resp: 13 16 12   Temp:      SpO2: 99% 96% 94%         Electronically Signed By: FRANK Simpson CRNA  July 31, 2020  8:37 PM

## 2020-08-01 NOTE — PROGRESS NOTES
"GENERAL SURGERY POST-OPERATIVE PROGRESS NOTE    SUBJECTIVE:  The patient is POD#1 s/p exploratory laparotomy, omental biopsy, and loop ileostomy.  Patient reports feeling better today, has only minimal surgical pain.  Ostomy is passing gas and stool, patient with no nausea or vomiting and is getting hungry.  Patient denies fevers or chills or other complaints.    OBJECTIVE:  /65   Pulse 94   Temp 98.1  F (36.7  C) (Tympanic)   Resp 20   Ht 1.753 m (5' 9\")   Wt 64.5 kg (142 lb 3.2 oz)   SpO2 95%   BMI 21.00 kg/m    Intake/Output Summary (Last 24 hours) at 8/1/2020 1358  Last data filed at 8/1/2020 0556  Gross per 24 hour   Intake 2511 ml   Output 1260 ml   Net 1251 ml       GENERAL: NAD  RESPIRATORY: No dyspnea  CARDIOVASCULAR: RRR  ABDOMEN: Distended, appropriate minimal incisional tenderness, no diffuse tenderness, stoma with expected edema but appears well perfused stool in the appliance with gas actively coming through the stoma  EXTREMITIES: No edema or tenderness    PERTINENT LABS or IMAGING:      ASSESSMENT:  Labs reviewed, hold hyponatremia improving  Calcium slightly increased will monitor creatinine stable  Hemoglobin down slightly which is expected with some hemodilution normal white count    PLAN:  1. Diet: We will hold off advancing diet given patient's distention today.  Will order of nutritional supplements for nutrition   2. Pain: Continue current as needed regimen  3. DVT prophylaxis: SCDs, encouraged ambulation, okay to start Lovenox this evening  4. Antibiotics: None indicated  5. Yu: Not following surgery, no urinary difficulties  6. Disposition: Continue hospitalization while awaiting resumption of bowel function from expected ileus after surgery and recovery of bowel function after bowel obstruction      Celso Sutherlnad MD  8/1/2020  2:04 PM  General Surgery      "

## 2020-08-01 NOTE — PROGRESS NOTES
Assessment completed by face to face with patient.    LOC: alert and oriented    Dx: Mass of colon  Chronic Disease Management: Colonic mass, Large bowel obstruction, mass of hepatic flexure of colon    Lives with: Alone  Living at:  Home in [place]  Transportation: YES , family, friends give rides. DtrSweetie will transport home upon discharge or grand-dtr, Marysol.    Primary PCP: Abilio Valladares, has not seen physician in quite some time but would like to stay with Dr. Valladares as he was pt's wife's PCP.  Insurance:  [employer]are Advantage  Medicare IM letter reviewed with patient.    Support System:  Family, Significant other, friends  Homecare/PCA: Previously had private housekeeping.    [place]: NO      How was the [facility] notification completed: N/A    Health Care Directive: No, interested in information sources.  Provided pt with HCD resources from [employer].      [facility]  Meds management: Family helps, States that his dtr-in-law is an RN and can help with med set-up along with his dtr.    Adequate Resources for needs (housing, utilities, food/med): YES  Household chores: Friend, Maria Guadalupe  Work/community/social activity: YES , states that he used to walk daily but has not in last few months, dtr says that he is staring to walk again with Maria Guadalupe/friend    ADLs: Independent  Ambulation:Independent  Falls: None in last three months  Nutrition: States that he has not been eating well in last 3-4 days due to abd pain.      Equipment used: None         Mental health: DaughterSweetie states that her dad was grieving the loss of his wife from last June(2019).  Patient voices no concerns with mental health at this time.  Substance abuse: Smokes one cigar daily, drinks 2-4 beers daily.  Exposure to violence/abuse: No concerns    Able to Return to Prior Living Arrangements: YES      ELEN: Low    Plan: Return home via dtr, [place] or grand-daughter Marysol.

## 2020-08-01 NOTE — PLAN OF CARE
Illeostomy draining small amts of brown stool. Abd remains distended and firm. Medicated with dilaudid and tylenol for abd pain. Denies nausea. NSR rate 90's , previous tachy 100-120. Encouraged ambulation. Daughter present in room. Using call light appropriately      Akanksha Chew RN   8/1/2020  7:03 PM  Face to face report given with opportunity to observe patient.    Report given to Kala Chew RN   8/1/2020  7:12 PM

## 2020-08-01 NOTE — BRIEF OP NOTE
[facility]    Brief Operative Note    Pre-operative diagnosis: Colonic mass [K63.89]  Post-operative diagnosis Obstructing colon mass    Procedure: Procedure(s):  Exploratory laparotomy, and Loop ileostomy creation, Omentum Biopsy  Surgeon: Surgeon(s) and Role:     * Celso Sutherland MD - Primary  Anesthesia: General   Estimated blood loss: Less than 10 ml  Drains: None  Specimens:   ID Type Source Tests Collected by Time Destination   A : OMENTUM AND IMPLANTS Tissue Omentum SURGICAL PATHOLOGY EXAM Celso Sutherland MD 7/31/2020  6:34 PM      Findings:   Hepatic flexure mass with dilated but healthy appearing ascending colon and healthy but dilated small bowel.  Complications: None.  Implants: None    Celso Sutherland MD on 7/31/2020 at 8:20 PM

## 2020-08-01 NOTE — ANESTHESIA CARE TRANSFER NOTE
Patient: Rudy Stevenson    Procedure(s):  Exploratory laparotomy, and Loop ileostomy creation, Omentum Biopsy    Diagnosis: Colonic mass [K63.89]  Diagnosis Additional Information: No value filed.    Anesthesia Type:   General     Note:  Airway :Face Mask  Patient transferred to:ICU  ICU Handoff: Call for PAUSE to initiate/utilize ICU HANDOFF, Identified Patient, Identified Responsible Provider, Reviewed the Pertinent Medical History, Discussed Surgical Course, Reviewed Intra-OP Anesthesia Management and Issues during Anesthesia, Set Expectations for Post Procedure Period and Allowed Opportunity for Questions and Acknowledgement of Understanding      Vitals: (Last set prior to Anesthesia Care Transfer)    CRNA VITALS  7/31/2020 1940 - 7/31/2020 2023 7/31/2020             Resp Rate (set):  8                Electronically Signed By: FRANK Simpson CRNA  July 31, 2020  8:23 PM

## 2020-08-01 NOTE — PROGRESS NOTES
Received consult for low residue diet teaching.  RD unavailable until 8/3.  Teaching will be done when RD returns.

## 2020-08-01 NOTE — PLAN OF CARE
A&Ox4. Prn dilaudid and scheduled IV tylenol given for abdominal pain. IV infusing LR @ 75 mL/hr into L ac. Pt remains NPO. Urinal voiding QS. LS have fine crackles throughout, with exp. Wheezes to bilateral upper lobes. Pt on 1 L nc. Hypoactive BS, denied N/V, denied flatus, pt has inguinal hernia. Island dressing to abdomen remains CDI. Ileostomy has scant amount of bloody output, stoma is pink. VSS, afebrile. Pt refused SCD's. Pt ambulated in room with assist of 1.     Face to face report given with opportunity to observe patient.    Report given to Akanksha Dominguez, RN   8/1/2020  7:06 AM

## 2020-08-01 NOTE — PLAN OF CARE
Face to face report given with opportunity to observe patient.    Report given to JULIETH Armendariz RN   7/31/2020  8:29 PM

## 2020-08-01 NOTE — PROGRESS NOTES
"      [facility]    History and Physical  Hospitalist       Date of Admission:  7/31/2020  Date of Service (when I saw the patient): 07/31/20    Assessment & Plan   Rudy Stevenson is a 69 year old man who presented to emergency department today because of lower back pain radiating to the pelvis.  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  Imaging also shows bilateral pleural effusions, and incidentally noted left ninth rib fracture which appears to be acute. He had noted abdominal distention for up to several months which he attributed to his beer drinking, pain as above and had more tense abdomen particularly over the past several weeks.  His pain is been most marked for him over the past 3-4 days.  For the last several weeks because of his pain he has been using ibuprofen up to every 4 hours in notes that he has been drinking more water because of his increased use of this medicine.  Other evaluation in the emergency department relatively unrevealing with the exception of hyponatremia.  He was admitted with plans for urgent operative relief of his colon obstruction.    1.  Colon obstruction  Laparotomy with diverting loop ileostomy last night.  Small gut appearance appeared unremarkable although likely incompetent cecum.  Almost certainly malignant.  In terms of treatment of malignancy the patient somewhat uncertain but willing to consider treatment that would keep any cancer \"dormant.\"  I discussed with him that certainly establishing a primary care physician and at least consultation with oncologist would be reasonable and would not commit him to any treatment he decided would not be in accord with his interests are wishes.  I also discussed with him that any complete recommendation for treatment would require results of pathology and further clinical staging.  2.  Chronic airflow obstruction  Still has some issues with secretion " clearance.  Improved with short acting bronchodilators.  Add long-acting antimuscarinic.  Depending on his course pulmonary function testing in another 3-4 weeks would not be unreasonable although not mandated.  Continue short acting bronchodilators.  He is a daily smoker currently 3-4 cigars daily.  Discussed with him that discontinuation would be overall to his benefit but might depend on other life-changing decisions which are pending for him.  Particularly for the past 3-4 weeks he has had increasing chest congestion digestion, difficulty in clearing secretions poorly responsive to over-the-counter antitussives.  This may in part be because of increasing abdominal distention particularly in the context of his hiatal hernia.  With exception of secretion clearance gas exchange and mechanics are reasonably well-preserved.  Continuing vigorous pulmonary hygiene.  3.  Hyponatremia  Markedly improved.  I suspect this is combination of polydipsia particularly when he was taking relatively high doses of ibuprofen as well as fluid shifts related to his abdominal process.  Beyond avoiding free water and intermittent monitoring significant changes in management are not warranted at the present time.  He had increased use of hypotonic fluids especially water over the past several weeks while he is taking relatively high doses of ibuprofen.  In other respects renal function appears relatively well-preserved.  4.  Ethanol use  No evidence of any withdrawal syndromes.  Regular use of ethanol.  1/4 on Cage questionnaire.  Likelihood of significant acute complications relatively low.  Will need usual surveillance for the possibility of withdrawal although this unlikely.  5.  Chronic nicotine use  As above tobacco cessation in general would be encouraged although issues of his overall quality of life must be considered.  3-4 cigars daily.  May contribute to his difficulties in secretion clearance as above.  Nicotine replacement  as indicated depending on his course.  6.  Acute blood loss anemia  Not unexpected given operative procedure.  Meets formal criteria for anemia with hemoglobin less than 12.  Several gram hemoglobin drop postoperatively.  No active bleeding.  At this point requires only intermittent monitoring.    DVT Prophylaxis: Enoxaparin begun postoperatively.  Code Status: Full Code; discussed with him issues of resuscitation and life support perioperatively.  This will need further discussion once he is further out from his operative procedure.    Disposition: Minimum of another several hospital days required simply from the perspective of recovery from operative procedure.  Expected discharge difficult to predict at the present time.  At least a number of days of in-hospital treatment and indeed several days of critical care management are likely to be required.    Yoni TRAN Dedrick    Physical Exam   Temp: 98.1  F (36.7  C) Temp src: Tympanic BP: 121/65 Pulse: 94 Heart Rate: 92 Resp: 20 SpO2: 92 % O2 Device: None (Room air) Oxygen Delivery: 1 LPM  Vital Signs with Ranges  Temp:  [97.4  F (36.3  C)-98.7  F (37.1  C)] 98.1  F (36.7  C)  Pulse:  [] 94  Heart Rate:  [] 92  Resp:  [12-20] 20  BP: ()/(50-95) 121/65  SpO2:  [92 %-100 %] 92 %  142 lbs 3.15 oz      Awake, alert, asthenic man sitting on bed on medical wards.  Pleasant and interactive.  HEENT: Pupils equal, conjugate. No icterus or nystagmus. Oral mucosa moist. No facial asymmetry.   Neck: Supple, jugular veins 1 cm water. Trachea midline   Chest: No chest wall movement asymmetry. Aeration globally mildly diminished. Accessory muscles not in use. Expiratory time not increased. No tidal wheezes.  Scattered sonorous rhonchi.  Rare mid inspiratory crackles.   Cardiac: PMI not displaced. S1, S2 unremarkable. No S3, S4. P2 not accentuated. No murmurs.    Abdomen: Ostomy excellent in appearance.  Beginning to show small volume output.  Soft abdomen.   Minimal candy-incisional tenderness.  No percussion tenderness.  Few bowel sounds.  No masses appreciated..   Extremities: No lower extremity edema.  Warm distally.  No eccymoses, clubbing.   Neurologic: Mental state above. Motor 5/5 and bilaterally equal. Tone preserved. No fasiculations or tremors.  Data   Data reviewed today:    Recent Labs   Lab 08/01/20  0619 07/31/20  0856   WBC 7.0 9.9   HGB 11.4* 13.2*   MCV 93 89    295   INR  --  1.31*   * 122*   POTASSIUM 5.1 4.5   CHLORIDE 96 86*   CO2 27 28   BUN 12 11   CR 0.59* 0.55*   ANIONGAP 7 8   MAGDALENO 8.1* 8.5   * 104*   ALBUMIN 1.6* 2.1*   PROTTOTAL 5.1* 6.5*   BILITOTAL 0.4 0.6   ALKPHOS 267* 214*   ALT 26 27   * 143*   LIPASE  --  41*            No results found for this or any previous visit (from the past 24 hour(s)).

## 2020-08-01 NOTE — OP NOTE
Procedure Date: 07/31/2020      PREOPERATIVE DIAGNOSIS:  Obstructing colon mass.      POSTOPERATIVE DIAGNOSIS:  Obstructing colon mass.      PROCEDURES:  Exploratory laparotomy, loop ileostomy creation, omental biopsy.      SURGEON:  Celso Sutherland MD      ANESTHESIA:  General.      ESTIMATED BLOOD LOSS:  10 mL      DRAINS:  None.      SPECIMENS:  Omentum with associated implants.      FINDINGS:  Hepatic flexure mass with dilated but healthy-appearing cecum.  Dilated small bowel also healthy and viable appearing.  No perforation seen.  Multiple implants throughout the omentum, retroperitoneum, along right paracolic gutter, and abdomen consistent with carcinomatosis.  Surface of the liver palpated with also multiple nodules consistent with multiple liver metastases.      INDICATIONS:  The patient is a pleasant 69-year-old male who presents with history of physical exam and imaging consistent with an obstructing colon mass with decreased bowel movements and abdominal pain.  CT imaging consistent with obstruction.  Treatment options were discussed.  We discussed both nonoperative and multiple operative treatment options.  After discussion, patient had all of his questions answered, he expressed understanding, and he wished to proceed with exploratory laparotomy with possible right hemicolectomy and possible ileostomy creation.  We discussed the risks of this procedure in detail.  We also discussed the risks of ileostomy creation, including the risks of stenosis, prolapse, leak, hernia, bleeding, and the need for possible additional procedures to revise the ostomy.  After discussion, patient had all of his questions answered, he expressed understanding, and he wished to proceed with this plan.      DESCRIPTION OF PROCEDURE:  After informed consent was obtained, the patient was brought to the operative suite and positioned supine on the operating table.  General anesthesia was induced per Anesthesia.  The patient  had SCDs on and running and received preoperative antibiotics prior to procedure.  The patient was prepped and draped in the usual sterile fashion.  A multidisciplinary timeout was performed.     A small upper midline incision was then created.  Electrocautery dissection was performed down to fascia.  Fascia was opened sharply.  Peritoneum was grasped and opened sharply.  The fascia and peritoneum was opened in the midline for the extent of the incision.  Jaison wound retractor was placed.  Abdomen was explored and the patient was immediately noted to have multiple nodules throughout the omentum, the right retroperitoneum, and the surface of the liver had multiple firm white nodules.  This was all consistent with likely carcinomatosis and liver metastases.  There were also a few small omental adhesions in the left abdomen.  No implants noted on the small bowel or small bowel mesentery.  No implants noted in the pelvis.  There were some implants along the right paracolic gutter.  The mass was palpated in the right upper quadrant and the mass felt adherent to the right upper quadrant, Gerota's fascia, and the duodenum.  Given the firm mass in this area and the multiple likely metastatic disease, I elected not to resect this given the patient's poor nutrition and the risk of a staple line leak as well as injury to adjacent structures.  I also felt there would be little benefit as preoperative imaging appeared that the patient's ileocecal valve was incompetent and he would be well diverted with a loop ileostomy.  There was an area of containing 4 firm nodules in the omentum consistent with omental implants.  I elected to remove these as the safest area for biopsy.  The omentum was serially clamped and divided below these nodules.  These clamps were tied with 3-0 Vicryl and this was hemostatic.  This omentum with associated implants was sent to Pathology for permanent section.  It was also noted that the patient had a  fair amount of ascites throughout the abdomen which could have been related to his malignancy or possibly his obstruction.  The small bowel was run from the ligament of Treitz to terminal ileum and all appeared healthy. There were no implants on small bowel or its mesentery.  There were no areas of perforation or compromise of the small bowel.  The cecum was identified and was dilated proximal to this mass but appeared healthy.  The remainder of the colon was palpated and there were no masses or other areas of obstruction within the colon.  Given the widespread areas concerning for malignancy, I elected to perform a loop ileostomy to relieve his obstruction. I also elected to not do a loop ascending colostomy given the implants along the ascending colon and risk of bringing one of these to the ostomy site. The terminal ileum was followed from the ileocecal valve.  It was then traced backwards approximately 20 cm where the area of the ileostomy was identified.  Skin that was previously marked by the wound ostomy nurses was identified and resected.  Electrocautery was used to dissect through subcutaneous fat.  The fascia was exposed.  A cruciate incision was made on the anterior rectus fascia.  Rectus muscle was spread.  A hole was then made in the posterior fascia while protecting the bowel with fingers posteriorly.  This hole was made to permit passing 2 fingers.  The previously designated small bowel was then brought through this hole and sat well without any tension.  The small bowel appeared very viable.  The abdomen was inspected and was hemostatic.  The initial closing counts were correct.  Fascia was then closed with a running 0 PDS starting proximally and distally and tied in the center.  Prior to tying the underside was swept to ensure no structures were incorporated with the incision. Wound was irrigated.  Skin was then closed with a running 4-0 subcuticular Monocryl given no bowel was entered during this  part of the operation.  Incision was then covered and attention was turned to ileostomy creation.  A transverse incision was made on the lower aspect of the ileum to create a loop ileostomy.  The bowel was then brooked and sutured to the dermis with a series of interrupted 3-0 Vicryls.  The site was hemostatic.  Appliance was applied.  The incision was covered with Steri-Strips and dressing.  It should be noted also that prior to closing the patient's peritoneum and rectus was injected with a total of 50 mL of 0.25% bupivacaine with epinephrine.  The patient's anesthesia was then reversed.  He was taken to the recovery room in hemodynamically stable condition.  The patient tolerated the procedure well with no complications.  All counts were correct at the end of the case as reported to me.         NICOLE COX MD             D: 2020   T: 2020   MT: WILLIAM      Name:     LIONEL MAZARIEGOS   MRN:      [id number]        Account:        [id number]   :      1950           Procedure Date: 2020      Document: [id number]

## 2020-08-02 NOTE — PROGRESS NOTES
Surgery progress note:    Patient evaluated again this evening. Continues to feel well. Ostomy continues to work. Still having gas and stool in appliance. Abdomen exam slightly less distended. Will continue to monitor.    Celso Sutherland MD on 8/2/2020 at 3:47 PM

## 2020-08-02 NOTE — PROGRESS NOTES
"Surgery progress note:    S: Patient reports feeling better tonight. Feels slightly more distended than usual. Denies abdominal pain. No nausea or vomiting. Reports continued gas and stool through ostomy    O: /68 (BP Location: Right arm)   Pulse 100   Temp 98.7  F (37.1  C) (Tympanic)   Resp 20   Ht 1.753 m (5' 9\")   Wt 64.5 kg (142 lb 3.2 oz)   SpO2 100%   BMI 21.00 kg/m    Gen: NAD, appears comfortable, pleasant and in no acute distress  Abdomen: Distended, soft, minimal incisional tenderness. Stoma edematous but pink with gas and stool in appliance  Skin: Warm and dry    A/P:  70 yo male POD #1 s/p diverting loop ileostomy for large bowel obstruction with incompetent ileocecal valve    Doing well overall.   Has expected post-operative ileus but ostomy working to give some decompression  Xray reviewed and consistent with post-op ileus  Encouraged ambulation and IS  Please call with changes in condition.    Celso Sutherland MD on 8/1/2020 at 10:37 PM      "

## 2020-08-02 NOTE — PLAN OF CARE
A&Ox4. Prn dilaudid and scheduled tylenol given for abdominal pain. Pt reporting lower back pain, ice pack applied with immediate relief. IVF increased to 100 mL/hr. Tolerating clear liquids. Pt denied N/V. Stoma pink/edematous, small amount of liquid stool and gas present in ileostomy. Hypoactive BS, abdomen is distended, and round. Island dressing is CDI. VSS, afebrile. Pt on Tele has been SR/ST 's. Offered pt to walk several times during the shift, pt continuously refused, educated pt on the importance of ambulating after surgery.     Face to face report given with opportunity to observe patient.    Report given to Akanksha Dominguez RN   8/2/2020  7:13 AM

## 2020-08-02 NOTE — PROGRESS NOTES
"GENERAL SURGERY POST-OPERATIVE PROGRESS NOTE    SUBJECTIVE:  The patient is POD#2 s/p exploratory laparotomy, omental biopsy, and loop ileostomy.  Patient reports doing well today. Still with some distension. Continues to pass significant gas through ostomy, and some stool. Only pain is low back pain, minimal incisional pain. Has been up walking and using IS. No fevers or chills.    OBJECTIVE:  /81   Pulse 104   Temp 97.8  F (36.6  C) (Tympanic)   Resp 20   Ht 1.753 m (5' 9\")   Wt 65.9 kg (145 lb 4.5 oz)   SpO2 94%   BMI 21.45 kg/m    Intake/Output Summary (Last 24 hours) at 8/1/2020 1358  Last data filed at 8/1/2020 0556  Gross per 24 hour   Intake 2511 ml   Output 1260 ml   Net 1251 ml       GENERAL: NAD  RESPIRATORY: No dyspnea  CARDIOVASCULAR: RRR  ABDOMEN: Remains distended, incision c/d/i, stoma pink, edema improving, gas and stool in appliance.  EXTREMITIES: No edema or tenderness    PERTINENT LABS or IMAGING:      ASSESSMENT:  Labs reviewed, hyperkalemia improving, kidney function improving  Hyponatremia stable  Hemoglobin stable, no leukocystosis    PLAN:  1. Diet: Continue NPO with sips only with continue distension, if remains distended, would consider starting TPN tomorrow given poor nutrition preop and possible prolonged ileus.  2. Pain: Continue current as needed regimen, will add toradol and decrease Dilaudid dose  3. DVT prophylaxis: SCDs, encouraged ambulation, Lovenox  4. Antibiotics: None indicated  5. Yu: Removed after surgery, no urinary difficulties  6. Disposition: Continue hospitalization while awaiting resolution of expected post-operative ileus      Celso Sutherland MD  8/1/2020  2:04 PM  General Surgery      "

## 2020-08-02 NOTE — PROVIDER NOTIFICATION
Updated Dr. Sutherland that per radiology report abdominal X-ray shows probable post-op ileus. Pt remains -110's. Small amount of stool in ileostomy. See MAR for new orders.

## 2020-08-02 NOTE — PLAN OF CARE
Pt up in halls to ambulate today. Lungs fine crackles bilaterally, remains on RA. Ileotomy draining small amts of brown stool. Bowels active [id number]. Surgical incision open to air with steri strips present. Medicated with toradol for abd pain with good pain relief. Ice to back for back pain r/t bed. Taking in clear protein shakes without difficulty. Denies nausea. Makes needs known . Call light within reach    Face to face report given with opportunity to observe patient.    Report given to JULIETH Armendariz RN   8/2/2020  6:59 PM

## 2020-08-03 NOTE — PROGRESS NOTES
This writer called [facility] to see if pt was established with Dr. Valladares.  Per scheduling, pt is already established with Dr. Valladares.  Updated pt with this information.  Also pt verbalized wanting Homecare services to help with new ostomy.  This writer updated [facility] ,Emily) on established PCP, et faxed over Homecare referral today.

## 2020-08-03 NOTE — PROGRESS NOTES
Consult for Low residue diet education.    Provided pt with verbal and written education on low residue diet/ileostomy diet recommendations. All of pt's questions were answered. Pt demonstrated understanding of diet recommendations. RD's contact information was provided if he has questions in the future.

## 2020-08-03 NOTE — PLAN OF CARE
A/O. VSS afebrile. Scheduled toradol and tylenol, PRN dilaudid given for surgical abd pain. D5NS with 20mEq K at 100ml/hr. Tolerates low fiber diet. Ileostomy pouch changed, stoma pink with watery brown stool, passing gas. Ambulates in gonzalez independently. Will continue to monitor.

## 2020-08-03 NOTE — PLAN OF CARE
A&Ox4. Prn toradol and scheduled tylenol given for pain. Ambulating independently in the room, bathroom voiding. D5NS+20kcl @ 100mL/hr. VSS, afebrile. Midline abdominal incision is covered with steri-strips. Ileostomy has good output, and gas. BS active. Tolerating clear liquid diet, denied N/V. LS are dim, on RA. Educated pt on the importance of using IS. Calling appropriately, call light is within reach.     Face to face report given with opportunity to observe patient.    Report given to Rhianna Dominguez RN   8/3/2020  7:18 AM

## 2020-08-03 NOTE — PROGRESS NOTES
"{MEDICINE        [facility]    History and Physical  Hospitalist       Date of Admission:  7/31/2020  Date of Service (when I saw the patient): 07/31/20    Assessment & Plan   Rudy Stevenson is a 69 year old man who presented to emergency department today because of lower back pain radiating to the pelvis.  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  Imaging also shows bilateral pleural effusions, and incidentally noted left ninth rib fracture which appears to be acute. He had noted abdominal distention for up to several months which he attributed to his beer drinking, pain as above and had more tense abdomen particularly over the past several weeks.  His pain is been most marked for him over the past 3-4 days.  For the last several weeks because of his pain he has been using ibuprofen up to every 4 hours in notes that he has been drinking more water because of his increased use of this medicine.  Other evaluation in the emergency department relatively unrevealing with the exception of hyponatremia.  He was admitted with plans for urgent operative relief of his colon obstruction.    1.  Colon obstruction  Laparotomy with diverting loop ileostomy 7/31.  Perhaps some residual ileus although marginal increase in ileostomy output.  Clear liquid diet begun last night.  If he continues to tolerate this in another 6-8 hours would advance depending on his response.  Ileus not unexpected at this point.  Encouraging walking he was somewhat reluctant yesterday.  Encouraged him to be walking at least 4-5 times daily to.  Improved pain control.  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  In terms of treatment of malignancy the patient somewhat uncertain but willing to consider treatment that would keep any cancer \"dormant.\"  I discussed with him that certainly establishing a primary care " physician and at least consultation with oncologist would be reasonable and would not commit him to any treatment he decided would not be in accord with his interests are wishes.  I also discussed with him that any complete recommendation for treatment would require results of pathology and further clinical staging.  2.  Chronic airflow obstruction  Still has some difficulty in secretion clearance.  No significant dyspnea.  Trial of Umeclidinium as well as short acting bronchodilators.  He is used guaifenesin orally although has not found it to be highly productive.  Discussed with him that efficacy may be in a range of 50%.  If it does not improve secretion clearance in another month or so he could simply discontinue this.  Similarly if he does not show a symptomatic response to Umeclidinium long-term benefit may not be great.  A trial of long-acting beta agonist at that time could be considered.  In addition inhaled corticosteroids might be worth at least an empiric trial.  Continue encouraging pulmonary hygiene.  Symptoms are relatively minimal unlikely that evaluation such as PFTs will be of great benefit.  Certainly not indicated currently.  He is a daily smoker currently 3-4 cigars daily.  Discussed with him that discontinuation would be overall to his benefit but might depend on other life-changing decisions which are pending for him.    3.  Hyponatremia  Stable although still mild hyponatremia.  Oral intake is minimal and what intake he has with clear liquids is hypotonic.  Beyond continuing nearly isotonic crystalloid further extensive investigation or active treatment not indicated.  Presentation hyponatremia a combination of polydipsia particularly when he was taking relatively high doses of ibuprofen as well as fluid shifts related to his abdominal process.  Beyond avoiding free water and intermittent monitoring significant changes in management are not warranted at the present time.  He had increased  use of hypotonic fluids especially water over the past several weeks while he is taking relatively high doses of ibuprofen.  In other respects renal function appears relatively well-preserved.  4.  Ethanol use  Affect improved today.  No irritability.  No overt alcohol withdrawal.  He remains at some risk of this.  Prior to admission regular use of ethanol.  1/4 on Cage questionnaire.  Likelihood of significant acute complications relatively low.  Will need usual surveillance for the possibility of withdrawal although this unlikely.  5.  Chronic nicotine use  As above tobacco cessation in general would be encouraged although issues of his overall quality of life must be considered.  3-4 cigars daily.  May contribute to his difficulties in secretion clearance as above.  Nicotine replacement as indicated depending on his course.  6.  Acute blood loss anemia  Stable hemoglobin.  Not unexpected given operative procedure.  Meets formal criteria for anemia with hemoglobin less than 12.  Several gram hemoglobin drop postoperatively.  No active bleeding.  At this point requires only intermittent monitoring.    DVT Prophylaxis: Enoxaparin begun postoperatively.  Code Status: Full Code; discussed with him issues of resuscitation and life support perioperatively.  This will need further discussion once he is further out from his operative procedure.    Disposition: Likely another several hospital days depending on his course.  Discharge depends largely on surgical considerations.  Follow-up with primary care.  Worthwhile to have oncology follow-up at least for initial evaluation.    Yoni Tse    Physical Exam   Temp: 97.3  F (36.3  C) Temp src: Tympanic BP: 143/78 Pulse: 98   Resp: 16 SpO2: 96 % O2 Device: None (Room air)    Vital Signs with Ranges  Temp:  [97.3  F (36.3  C)-97.6  F (36.4  C)] 97.3  F (36.3  C)  Pulse:  [] 98  Resp:  [16-20] 16  BP: (129-152)/(77-84) 143/78  SpO2:  [96 %-98 %] 96 %  150 lbs 2.13  oz      Awake, alert, asthenic man sitting on bed on medical wards.  Brighter affect.  Pleasant and interactive.  HEENT: Pupils equal, conjugate. No icterus or nystagmus. Oral mucosa moist. No facial asymmetry.   Neck: Supple, jugular veins 1 cm water. Trachea midline   Chest: No chest wall movement asymmetry. Aeration globally mildly diminished. Accessory muscles not in use. Expiratory time not increased. No tidal wheezes.  Scattered sonorous rhonchi.  Rare mid inspiratory crackles.   Cardiac: PMI not displaced. S1, S2 unremarkable. No S3, S4. P2 not accentuated. No murmurs.    Abdomen: Ostomy excellent in appearance.  Increasingly liquid ileostomy output.  Soft abdomen.  Minimal candy-incisional tenderness.  No percussion tenderness.  Few bowel sounds.  No masses appreciated..   Extremities: No lower extremity edema.  Warm distally.  No eccymoses, clubbing.   Neurologic: Mental state above. Motor 5/5 and bilaterally equal. Tone preserved. No fasiculations or tremors.  Data   Data reviewed today:    Recent Labs   Lab 08/03/20  0501 08/02/20  0559 08/01/20  0619 07/31/20  0856   WBC  --  6.6 7.0 9.9   HGB  --  11.5* 11.4* 13.2*   MCV  --  91 93 89    249 253 295   INR  --   --   --  1.31*   NA  --  130* 130* 122*   POTASSIUM  --  3.9 5.1 4.5   CHLORIDE  --  99 96 86*   CO2  --  26 27 28   BUN  --  14 12 11   CR  --  0.46* 0.59* 0.55*   ANIONGAP  --  5 7 8   MAGDALENO  --  7.9* 8.1* 8.5   GLC  --  108* 103* 104*   ALBUMIN  --  1.6* 1.6* 2.1*   PROTTOTAL  --  5.3* 5.1* 6.5*   BILITOTAL  --  0.4 0.4 0.6   ALKPHOS  --  265* 267* 214*   ALT  --  26 26 27   AST  --  187* 257* 143*   LIPASE  --   --   --  41*            No results found for this or any previous visit (from the past 24 hour(s)).

## 2020-08-03 NOTE — CONSULTS
Nurse reported that ostomy pouching change was already changed today.     Will have Wound Care/Ostomy nurse stop by tomorrow to address ostomy.      Colette Amin APRN FNP-BC  Diabetes and Wound Care

## 2020-08-03 NOTE — PROGRESS NOTES
"INPATIENT ROUNDING NOTE  8/3/2020    Patient: Rudy Quezadaryman    Physician of Record: Hospitalist Service and Loc[facility]    Admitting diagnosis: Hyponatremia [E87.1]  Small bowel obstruction (H) [K56.609]  Colonic mass [K63.89]    Procedure(s):  Exploratory laparotomy, and Loop ileostomy creation, Omentum Biopsy     POD: 3 Days Post-Op    Current Diet: Clear liquids    CURRENT MEDICATIONS:  Continuous Medications:  Current Facility-Administered Medications   Medication Last Rate     dextrose 5% and 0.9% NaCl with potassium chloride 20 mEq 100 mL/hr at 08/03/20 0740     lactated ringers         Scheduled Medications:  Current Facility-Administered Medications   Medication Dose Route Frequency     acetaminophen  975 mg Oral Q6H     enoxaparin ANTICOAGULANT  40 mg Subcutaneous Q24H     ketorolac  15 mg Intravenous Q6H     sodium chloride (PF)  3 mL Intracatheter Q8H     umeclidinium  1 puff Inhalation Daily       PRN Medications:  Current Facility-Administered Medications   Medication Dose Route Frequency     albuterol  2 puff Inhalation Q6H PRN     HYDROmorphone  0.3 mg Intravenous Q2H PRN     ipratropium - albuterol 0.5 mg/2.5 mg/3 mL  3 mL Nebulization Q4H PRN     lactated ringers  500 mL Intravenous Q4H PRN     lidocaine 4%   Topical Q1H PRN     lidocaine (buffered or not buffered)  0.1-1 mL Other Q1H PRN     naloxone  0.1-0.4 mg Intravenous Q2 Min PRN     sodium chloride (PF)  3 mL Intracatheter q1 min prn       SUBJECTIVE:   Nausea: No. Vomiting: No. Fever: No. Chills: No. Excessive burping: No. Flatus: Yes. BM: Yes. Pain is 4/10. Pain control: good. Tolerating current diet: Yes.      PHYSICAL EXAM:   Vital signs: /75   Pulse 98   Temp 98.5  F (36.9  C) (Tympanic)   Resp 16   Ht 1.753 m (5' 9\")   Wt 68.1 kg (150 lb 2.1 oz)   SpO2 97%   BMI 22.17 kg/m     Weight: [unfilled]   BMI: Body mass index is 22.17 kg/m .   General: Normal, healthy, cooperative, in no acute distress, alert   HEENT: PERRLA and " EOMI   Neck: supple   Lungs: clear to auscultation   CV: Regular rate and rhythm without murmer   Abdominal: Abdomen soft, non-tender. BS normal. No masses, organomegaly   Wound: Closed wound. Clean, dry and intact. Healing well.   Extremities: No cyanosis, clubbing or edema noted bilaterally in Upper and Lower Extremities   Neurological: without deficit    INPUT/OUTPUT:      Intake/Output Summary (Last 24 hours) at 8/3/2020 1427  Last data filed at 8/3/2020 1149  Gross per 24 hour   Intake 2526 ml   Output 450 ml   Net 2076 ml       I/O last 3 completed shifts:  In: 2526 [P.O.:800; I.V.:1726]  Out: 510 [Urine:100; Stool:410]    LABS:    Last CBC Rrsults:   Recent Labs   Lab Test 08/03/20  0501 08/02/20  0559 08/01/20 0619 07/31/20  0856   WBC  --  6.6 7.0 9.9   RBC  --  3.63* 3.59* 4.13*   HGB  --  11.5* 11.4* 13.2*   HCT  --  32.9* 33.3* 36.7*   MCV  --  91 93 89   MCH  --  31.7 31.8 32.0   MCHC  --  35.0 34.2 36.0   RDW  --  12.3 12.1 12.1    249 253 295       Last Comprehensive Metabolic panel:  Recent Labs   Lab Test 08/02/20  0559 08/01/20 0619 07/31/20  0856   * 130* 122*   POTASSIUM 3.9 5.1 4.5   CHLORIDE 99 96 86*   CO2 26 27 28   ANIONGAP 5 7 8   * 103* 104*   BUN 14 12 11   CR 0.46* 0.59* 0.55*   GFRESTIMATED >90 >90 >90   MAGDALENO 7.9* 8.1* 8.5   BILITOTAL 0.4 0.4 0.6   ALKPHOS 265* 267* 214*   ALT 26 26 27   * 257* 143*       Recent Labs   Lab Test 08/02/20  0559 08/01/20  0619 07/31/20  0856   MAG 2.4* 2.8*  --    ALBUMIN 1.6* 1.6* 2.1*       ASSESSMENT:    3 Days Post-Op from Procedure(s):  Exploratory laparotomy, and Loop ileostomy creation, Omentum Biopsy.     Overall doing well.    PLAN: We will advance his diet to regular.  We will see how he does.  Most likely be able to go home in the next 1 to 2 days.

## 2020-08-04 PROBLEM — F10.90 CHRONIC ALCOHOL USE: Status: ACTIVE | Noted: 2020-01-01

## 2020-08-04 PROBLEM — Z93.2 S/P ILEOSTOMY (H): Status: ACTIVE | Noted: 2020-01-01

## 2020-08-04 PROBLEM — E87.1 HYPONATREMIA: Status: ACTIVE | Noted: 2020-01-01

## 2020-08-04 PROBLEM — D62 ANEMIA DUE TO BLOOD LOSS, ACUTE: Status: ACTIVE | Noted: 2020-01-01

## 2020-08-04 PROBLEM — Z72.0 NICOTINE ABUSE: Status: ACTIVE | Noted: 2020-01-01

## 2020-08-04 NOTE — PLAN OF CARE
Face to face report given with opportunity to observe patient.    Report given to Barbara Whitaker RN   8/3/2020  7:09 PM

## 2020-08-04 NOTE — PROGRESS NOTES
"INPATIENT ROUNDING NOTE  8/4/2020    Patient: Rudy Quezadaryman    Physician of Record: hospitalist and Locums    Admitting diagnosis: Hyponatremia [E87.1]  Small bowel obstruction (H) [K56.609]  Colonic mass [K63.89]    Procedure(s):  Exploratory laparotomy, and Loop ileostomy creation, Omentum Biopsy     POD: 4 Days Post-Op    Current Diet: Regular    CURRENT MEDICATIONS:  Continuous Medications:  Current Facility-Administered Medications   Medication Last Rate     dextrose 5% and 0.9% NaCl with potassium chloride 20 mEq 100 mL/hr at 08/03/20 1746     lactated ringers         Scheduled Medications:  Current Facility-Administered Medications   Medication Dose Route Frequency     acetaminophen  975 mg Oral Q6H     enoxaparin ANTICOAGULANT  40 mg Subcutaneous Q24H     ketorolac  15 mg Intravenous Q6H     sodium chloride (PF)  3 mL Intracatheter Q8H     umeclidinium  1 puff Inhalation Daily       PRN Medications:  Current Facility-Administered Medications   Medication Dose Route Frequency     albuterol  2 puff Inhalation Q6H PRN     HYDROmorphone  0.3 mg Intravenous Q2H PRN     ipratropium - albuterol 0.5 mg/2.5 mg/3 mL  3 mL Nebulization Q4H PRN     lactated ringers  500 mL Intravenous Q4H PRN     lidocaine 4%   Topical Q1H PRN     lidocaine (buffered or not buffered)  0.1-1 mL Other Q1H PRN     naloxone  0.1-0.4 mg Intravenous Q2 Min PRN     sodium chloride (PF)  3 mL Intracatheter q1 min prn       SUBJECTIVE:   Nausea: No. Vomiting: No. Fever: No. Chills: No. Excessive burping: No. Flatus: Yes. BM: Yes. Pain is 3/10. Pain control: good. Tolerating current diet: Yes.      PHYSICAL EXAM:   Vital signs: /79   Pulse 91   Temp 98.7  F (37.1  C) (Tympanic)   Resp 16   Ht 1.753 m (5' 9\")   Wt 68.1 kg (150 lb 2.1 oz)   SpO2 98%   BMI 22.17 kg/m     Weight: [unfilled]   BMI: Body mass index is 22.17 kg/m .   General: Normal, cooperative, in no acute distress, alert   HEENT: PERRLA and EOMI   Neck: supple   Lungs: " clear to auscultation   CV: Regular rate and rhythm without murmer   Abdominal: Abdomen soft, non-tender. BS normal. No masses, organomegaly   Wound: Closed wound. Clean, dry and intact. Healing well.   Extremities: No cyanosis, clubbing or edema noted bilaterally in Upper and Lower Extremities   Neurological: without deficit    INPUT/OUTPUT:      Intake/Output Summary (Last 24 hours) at 8/4/2020 0853  Last data filed at 8/4/2020 0600  Gross per 24 hour   Intake 2600 ml   Output 925 ml   Net 1675 ml       I/O last 3 completed shifts:  In: 2600 [P.O.:120; I.V.:2480]  Out: 925 [Stool:925]    LABS:    Last CBC Rrsults:   Recent Labs   Lab Test 08/04/20  0517 08/03/20  0501 08/02/20  0559 08/01/20  0619   WBC 9.2  --  6.6 7.0   RBC 3.46*  --  3.63* 3.59*   HGB 10.9*  --  11.5* 11.4*   HCT 31.4*  --  32.9* 33.3*   MCV 91  --  91 93   MCH 31.5  --  31.7 31.8   MCHC 34.7  --  35.0 34.2   RDW 12.6  --  12.3 12.1    230 249 253       Last Comprehensive Metabolic panel:  Recent Labs   Lab Test 08/04/20  0517 08/02/20  0559 08/01/20  0619 07/31/20  0856   * 130* 130* 122*   POTASSIUM 4.1 3.9 5.1 4.5   CHLORIDE 105 99 96 86*   CO2 23 26 27 28   ANIONGAP 4 5 7 8   * 108* 103* 104*   BUN 16 14 12 11   CR 0.62* 0.46* 0.59* 0.55*   GFRESTIMATED >90 >90 >90 >90   MAGDALENO 7.5* 7.9* 8.1* 8.5   BILITOTAL  --  0.4 0.4 0.6   ALKPHOS  --  265* 267* 214*   ALT  --  26 26 27   AST  --  187* 257* 143*       Recent Labs   Lab Test 08/02/20  0559 08/01/20  0619 07/31/20  0856   MAG 2.4* 2.8*  --    ALBUMIN 1.6* 1.6* 2.1*       ASSESSMENT:    4 Days Post-Op from Procedure(s):  Exploratory laparotomy, and Loop ileostomy creation, Omentum Biopsy.     Overall doing well.    PLAN: From a general surgery point of view the patient is stable and is able to go home.  He will need to follow-up with me in 1 week.

## 2020-08-04 NOTE — PLAN OF CARE
BS active, loose watery stool from ileostomy.  This writer reviewed education on burping his ileostomy bag and emptying it, pt did well.  Pt verbalized anxieties over the bag and adjusting to managing it.  Daughter, Miguel, at bedside and wound/ostomy Shani at bedside, and educated patient and daughter. Shani, stated the patient and daughter did well.  Dr Aleman, Surgeon, at bedside per daughter request to discuss patient.  Rates abdominal pain 2-3/10, Scheduled PO tylenol and IV Toradol given.  Pt ambulated the hallway, and the pain was worse after that.  New PIV placed, and saline locked.

## 2020-08-04 NOTE — PROGRESS NOTES
[facility]    History and Physical  Hospitalist       Date of Admission:  7/31/2020  Date of Service (when I saw the patient): 07/31/20    Assessment & Plan   Rudy Stevenson is a 69 year old man who presented to emergency department today because of lower back pain radiating to the pelvis.  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  Imaging also shows bilateral pleural effusions, and incidentally noted left ninth rib fracture which appears to be acute. He had noted abdominal distention for up to several months which he attributed to his beer drinking, pain as above and had more tense abdomen particularly over the past several weeks.  His pain is been most marked for him over the past 3-4 days.  For the last several weeks because of his pain he has been using ibuprofen up to every 4 hours in notes that he has been drinking more water because of his increased use of this medicine.  Other evaluation in the emergency department relatively unrevealing with the exception of hyponatremia.  He was admitted with plans for urgent operative relief of his colon obstruction.    1.  Colon obstruction  Laparotomy with diverting loop ileostomy 7/31.  Ileostomy output is beginning to increase somewhat although perhaps there is a minimal residual ileus.  However he is tolerating a diet advanced to low residue diet.  Anticipate this will continue to improve.  He is beginning to participate in ileostomy care.  Daughter was also in this morning and ostomy nurse was able to instruct her in care of ileostomy.  Home care will also be arranged at the time of discharge.  Once it certain that he and family are able to adequate care of his ileostomy discharge would be reasonable.  Continuing to encourage walking.  Encouraged him to be walking at least 4-5 times daily to.  Improved pain control.  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  In terms of treatment of malignancy the patient somewhat uncertain but willing to consider treatment that would keep any cancer \"dormant.\"  I discussed with him that certainly establishing a primary care physician and at least consultation with oncologist would be reasonable and would not commit him to any treatment he decided would not be in accord with his interests are wishes.  I also discussed with him that any complete recommendation for treatment would require results of pathology and further clinical staging.  2.  Chronic airflow obstruction  Secretion clearance somewhat improved.  This may be in part related to decreased gut distention.  No significant dyspnea.  Trial of Umeclidinium as well as short acting bronchodilators.  He is used guaifenesin orally although has not found it to be highly productive.  Discussed with him that efficacy may be in a range of 50%.  If it does not improve secretion clearance in another month or so he could simply discontinue this.  Similarly if he does not show a symptomatic response to Umeclidinium long-term benefit may not be great.  A trial of long-acting beta agonist at that time could be considered.  In addition inhaled corticosteroids might be worth at least an empiric trial.  Continue encouraging pulmonary hygiene.  Symptoms are relatively minimal unlikely that evaluation such as PFTs will be of great benefit.  Certainly not indicated currently.  He is a daily smoker currently 3-4 cigars daily.  Discussed with him that discontinuation would be overall to his benefit but might depend on other life-changing decisions which are pending for him.    3.  Hyponatremia  Improved.  Likely combination of polydipsia during the.  Prior to admission when he was taking high doses of ibuprofen along with significant amount of water with his doses.  Ibuprofen itself may have had some effect on his hyponatremia.  With his high low " solute intake he may have had some osmotic washout of renal Annabelle as well.  Overall anticipate this will continue to improve.  With advancing his diet he will have an increased solute intake.  In other respects renal function appears relatively well-preserved.  4.  Ethanol use  Affect improved today.  No irritability present now several days ago.  No overt alcohol withdrawal.  Prior to admission regular use of ethanol.  1/4 on Cage questionnaire.  Likelihood of significant acute complications relatively low.  Will need usual surveillance for the possibility of withdrawal although this unlikely.  5.  Chronic nicotine use  As above tobacco cessation in general would be encouraged although issues of his overall quality of life must be considered.  3-4 cigars daily.  May contribute to his difficulties in secretion clearance as above.  Nicotine replacement as indicated depending on his course.  6.  Acute blood loss anemia  Stable hemoglobin.  Not unexpected given operative procedure.  Meets formal criteria for anemia with hemoglobin less than 12.  Several gram hemoglobin drop postoperatively.  No active bleeding.  At this point requires only intermittent monitoring.    DVT Prophylaxis: Enoxaparin begun postoperatively.  Code Status: Full Code; discussed with him issues of resuscitation and life support perioperatively.  This will need further discussion once he is further out from his operative procedure.    Disposition: Likely another 1-2 hospital days depending on his course.      Yoni Tse    Physical Exam   Temp: 98.2  F (36.8  C) Temp src: Tympanic BP: 150/90 Pulse: 89   Resp: 16 SpO2: 96 % O2 Device: None (Room air)    Vital Signs with Ranges  Temp:  [97.9  F (36.6  C)-98.7  F (37.1  C)] 98.2  F (36.8  C)  Pulse:  [85-91] 89  Resp:  [16] 16  BP: (143-153)/(79-90) 150/90  SpO2:  [96 %-98 %] 96 %  150 lbs 2.13 oz      Awake, alert, asthenic man sitting on bed on medical wards.  Bright affect.  Pleasant and  interactive.  HEENT: Pupils equal, conjugate. No icterus or nystagmus. Oral mucosa moist. No facial asymmetry.   Neck: Supple, jugular veins 1 cm water. Trachea midline   Chest: No chest wall movement asymmetry. Aeration globally mildly diminished. Accessory muscles not in use. Expiratory time not increased. No tidal wheezes.  Scattered sonorous rhonchi.  Rare mid inspiratory crackles.   Cardiac: PMI not displaced. S1, S2 unremarkable. No S3, S4. P2 not accentuated. No murmurs.    Abdomen: Ostomy excellent in appearance.  Increasingly liquid ileostomy output.  Soft abdomen.  Minimal candy-incisional tenderness.  No percussion tenderness.  Few bowel sounds.  No masses appreciated..   Extremities: No lower extremity edema.  Warm distally.  No eccymoses, clubbing.   Neurologic: Mental state above. Motor 5/5 and bilaterally equal. Tone preserved. No fasiculations or tremors.  Data   Data reviewed today:    Recent Labs   Lab 08/04/20  0517 08/03/20  0501 08/02/20  0559 08/01/20  0619 07/31/20  0856   WBC 9.2  --  6.6 7.0 9.9   HGB 10.9*  --  11.5* 11.4* 13.2*   MCV 91  --  91 93 89    230 249 253 295   INR  --   --   --   --  1.31*   *  --  130* 130* 122*   POTASSIUM 4.1  --  3.9 5.1 4.5   CHLORIDE 105  --  99 96 86*   CO2 23  --  26 27 28   BUN 16  --  14 12 11   CR 0.62*  --  0.46* 0.59* 0.55*   ANIONGAP 4  --  5 7 8   MAGDALENO 7.5*  --  7.9* 8.1* 8.5   *  --  108* 103* 104*   ALBUMIN  --   --  1.6* 1.6* 2.1*   PROTTOTAL  --   --  5.3* 5.1* 6.5*   BILITOTAL  --   --  0.4 0.4 0.6   ALKPHOS  --   --  265* 267* 214*   ALT  --   --  26 26 27   AST  --   --  187* 257* 143*   LIPASE  --   --   --   --  41*            No results found for this or any previous visit (from the past 24 hour(s)).

## 2020-08-04 NOTE — PLAN OF CARE
VSS on room air. Remains afebrile. Up ad laura in room. IV infusing D5NS with 20 K at 100ml/hr. IV site WDL. Bowel sounds active. Illeostomy putting out brown liquid. Burped bag multiple times. Incision JACOB and WDL. Scheduled tylenol and toradol for pain. Daughter will be here this morning and would like to speak with MD and be present for wound care. Using call light appropriately.     Face to face report given with opportunity to observe patient.    Report given to JULIETH Carvalho RN   8/4/2020  7:01 AM

## 2020-08-04 NOTE — CONSULTS
30 minuets spent with client and daughter reviewing ostomy care and changing of appliance. Clients daughter states she has additional help from family members with a medical background to help with the ostomy changes. Gave order numbers for products and number for ostomy/wound care in discharge instructions

## 2020-08-05 NOTE — PLAN OF CARE
Face to face report given with opportunity to observe patient.    Report given to JULIETH Sharif.    Maia Bartlett RN   8/4/2020  7:00 PM

## 2020-08-05 NOTE — PLAN OF CARE
Pt A&O x4, VSS on RA. C/o 1-3/10 abdominal pain and some lower back discomfort. Scheduled tylenol and toradol in use. Got a lidocaine patch ordered for pt. HRR and lungs are clear. BS active, ileostomy filling with gas and pt states he is passing gas rectally. Pt more independent with ostomy this shift, encouraged him to empty it on his own which he did. Site is [facility]. Pt up ind in halls and room. Pt slept on and off throughout night. No other significant events, will continue to monitor.

## 2020-08-05 NOTE — PLAN OF CARE
VSS. Emptying ileostomy per self without difficulty. Sent home with 1 days worth of attachments. Offered no complaints/questions. Up independent in room. C/o no pain. Active bowels. Ileostomy draining dark brown stool.     Patient discharged at 1:24 PM via ambulation accompanied by staff. Prescriptions sent to patients preferred pharmacy. All belongings sent with patient.     Discharge instructions reviewed with patient. Listed belongings gathered and returned to patient. Clothes shoes    Patient discharged to home.     Core Measures and Vaccines  Core Measures applicable during stay: No. N/a  Pneumonia and Influenza given prior to discharge, if indicated: N/A    Surgical Patient   Surgical Procedures during stay: yes  Did patient receive discharge instruction on wound care and recognition of infection symptoms? Yes    MISC  Follow up appointment made:  Yes  Home and hospital aquired medications returned to patient: Yes  Patient reports pain was well managed at discharge: Yes

## 2020-08-05 NOTE — PLAN OF CARE
Face to face report given with opportunity to observe patient.    Report given to JULIETH Vale   8/5/2020  7:22 AM

## 2020-08-05 NOTE — DISCHARGE SUMMARY
[facility]    Discharge Summary  Hospitalist    Date of Admission:  2020  Date of Discharge:  2020  1:30 PM  Discharging Provider: Treasure Salomon CNP  Date of Service (when I saw the patient): 20    Discharge Diagnoses   Principal Problem:    Mass of colon  Active Problems:    Large bowel obstruction (H)    Mass of hepatic flexure of colon    S/P ileostomy (H)    Hyponatremia    Chronic alcohol use    Nicotine abuse    Anemia due to blood loss, acute      History of Present Illness   From admission: Rudy Stevenson is a 69 year old man who present 3-4 days of more severe lower back pain radiating to pelvis.  Ondiscussion with him and his reflection a longer period of time of progressive lower back pain.  He is been evaluated by a chiropractor with some although not significant improvement.  For the past 3-4 weeks he is taking increasingly frequent doses of ibuprofen up to 800 mg perhaps every 4 hours.  Because of this he has been drinking more water.  Evaluation in emergency department showed a hyponatremia as well as an obstructing colon mass at the hepatic flexure.  Evaluation also showed bilateral pleural effusions, hiatal hernia with most of stomach in thoracic cavity and a left ninth rib, posterior lateral fracture which appears to be acute.  He does recall a somewhat remote injury although he is not certain whether this involved injury to his chest.  Over the past 3-4 weeks he has had increasing cough and difficulty and clearing secretions which may be the cause of this fracture.  No pneumothorax.  He notes that his wife  approximately 8 months ago and for at least a period of time he did not pay much attention to his symptoms or his care.  He is noted some abdominal distention over a number of months which she attributed to his beer consumption.  Subsequently over the past 4 weeks to perhaps as long as several months he has had increasing lower back pain as well as more tense  "abdomen.  His pain became more acute for him, however, over the past 3-4 days.  He has had no resting dyspnea.  No hemoptysis.  He is not noted any change in his bowel habits.  No melenic stool or blood in his stool.  He has not had any urinary symptoms.  He has noted weight loss over the past 3-4 months.    Hospital Course   1.  Colon obstruction  Laparotomy with diverting loop ileostomy 7/31.  Ileostomy output is beginning to increase.  He is tolerating a diet advanced to low residue diet. He is participating in ileostomy care as is his daughter.Home care arranged at the time of discharge.  Once it certain that he and family are able to adequate care of his ileostomy discharge would be reasonable.    In terms of treatment of malignancy the patient somewhat uncertain but willing to consider treatment that would keep any cancer \"dormant.\"  I discussed with him that certainly establishing a primary care physician and at least consultation with oncologist would be reasonable and would not commit him to any treatment he decided would not be in accord with his interests are wishes.  I also discussed with him that any complete recommendation for treatment would require results of pathology and further clinical staging. Referral is placed for oncology follow-up. He would like to establish with Dr. Valladares as that is who his wife was established with.     2.  Chronic airflow obstruction  Secretion clearance somewhat improved.  This may be in part related to decreased gut distention.  No significant dyspnea.  Trial of Umeclidinium as well as short acting bronchodilators.  He is used guaifenesin orally although has not found it to be highly productive.  He is a daily smoker currently 3-4 cigars daily.  Discussed with him that discontinuation would be overall to his benefit but might depend on other life-changing decisions which are pending for him.    3.  Hyponatremia  Resolved.  Prior to admission  he was taking high doses " of ibuprofen along with significant amount of water with his doses.  Ibuprofen itself may have had some effect on his hyponatremia.  In other respects renal function appears relatively well-preserved.  4.  Ethanol use  Prior to admission regular use of ethanol.  1/4 on Cage questionnaire.  Encouraged cessation with current diagnoses pending further workup.   5.  Chronic nicotine use  As above tobacco cessation in general would be encouraged although issues of his overall quality of life must be considered.  3-4 cigars daily.  May contribute to his difficulties in secretion clearance as above. Encouraged cessation.  6.  Acute blood loss anemia: Expected post-operative blood loss. Hemoglobin has been stable now for several days. No signs of active bleeding.     Treasure Salomon CNP      Code Status   Full Code       Primary Care Physician   Abilio Valladares    Discharge Disposition   Discharged to home  Condition at discharge: Stable    Consultations This Hospital Stay   SOCIAL WORK IP CONSULT  COLORECTAL SURGERY IP CONSULT  WOUND OSTOMY CONTINENCE NURSE  IP CONSULT  NUTRITION SERVICES ADULT IP CONSULT    Time Spent on this Encounter   I, Treasure Salomon NP, personally saw the patient today and spent greater than 30 minutes discharging this patient.    Discharge Orders      Oncology/Hematology Adult Referral      Home care nursing referral      Reason for your hospital stay    Colon mass with obstruction     Follow-up and recommended labs and tests     Follow up with primary care provider, Abilio Valladares, within 7 days for hospital follow- up.  No follow up labs or test are needed.  Follow-up with Dr. Aleman in 7 days for post-surgical evaluation.     Activity    Your activity upon discharge: activity as tolerated     When to contact your care team    Call your primary doctor if you have any of the following: fever, increasing abdominal pain. No or very decreased output from ostomy.     Wound care and  dressings    Instructions to care for your wound at home: keep wound clean and dry and do not soak in water (bath, lake etc) until cleared by surgery. May shower, do not scrub incision and pat dry.     MD face to face encounter    Documentation of Face to Face and Certification for Home Health Services    I certify that patient: Rudy Stevenson is under my care and that I, or a nurse practitioner or physician's assistant working with me, had a face-to-face encounter that meets the physician face-to-face encounter requirements with this patient on: 8/5/2020.    This encounter with the patient was in whole, or in part, for the following medical condition, which is the primary reason for home health care: new ileostomy.    I certify that, based on my findings, the following services are medically necessary home health services: Nursing.    My clinical findings support the need for the above services because: Nurse is needed: For complex aftercare of surgical procedures because the patient needs instruction and cannot perform care on their own due to: new ileostomy requires more teaching. and To provide caregiver training to assist with: ileostomy care.    Further, I certify that my clinical findings support that this patient is homebound (i.e. absences from home require considerable and taxing effort and are for medical reasons or [employer] services or infrequently or of short duration when for other reasons) because: Leaving home is medically contraindicated for the following reason(s): Infection risk / immunocompromised state where it is safer for them to receive services in the home...    Based on the above findings. I certify that this patient is confined to the home and needs intermittent skilled nursing care, physical therapy and/or speech therapy.  The patient is under my care, and I have initiated the establishment of the plan of care.  This patient will be followed by a physician who will periodically review  the plan of care.  Physician/Provider to provide follow up care: Abilio Valladares    Attending hospital physician (the Medicare certified OZZY provider): Treasure Salomon NP  Physician Signature: See electronic signature associated with these discharge orders.  Date: 8/5/2020     Full Code     Diet    Follow this diet upon discharge: Orders Placed This Encounter      Low Fiber Diet     Discharge Medications   Current Discharge Medication List      START taking these medications    Details   acetaminophen (TYLENOL) 325 MG tablet Take 3 tablets (975 mg) by mouth every 6 hours as needed for mild pain    Associated Diagnoses: S/P ileostomy (H)      albuterol (PROAIR HFA/PROVENTIL HFA/VENTOLIN HFA) 108 (90 Base) MCG/ACT inhaler Inhale 2 puffs into the lungs every 6 hours as needed for wheezing  Qty:      Comments: Pharmacy may dispense brand covered by insurance (Proair, or proventil or ventolin or generic albuterol inhaler)  Associated Diagnoses: Chronic obstructive pulmonary disease, unspecified COPD type (H)      umeclidinium (INCRUSE ELLIPTA) 62.5 MCG/INH inhaler Inhale 1 puff into the lungs daily  Qty: 30 each, Refills: 0    Associated Diagnoses: Chronic obstructive pulmonary disease, unspecified COPD type (H)           Allergies   No Known Allergies  Data   Most Recent 3 CBC's:  Recent Labs   Lab Test 08/04/20  0517 08/03/20  0501 08/02/20  0559 08/01/20  0619   WBC 9.2  --  6.6 7.0   HGB 10.9*  --  11.5* 11.4*   MCV 91  --  91 93    230 249 253      Most Recent 3 BMP's:  Recent Labs   Lab Test 08/04/20  0517 08/02/20  0559 08/01/20  0619   * 130* 130*   POTASSIUM 4.1 3.9 5.1   CHLORIDE 105 99 96   CO2 23 26 27   BUN 16 14 12   CR 0.62* 0.46* 0.59*   ANIONGAP 4 5 7   MAGDALENO 7.5* 7.9* 8.1*   * 108* 103*     Most Recent 2 LFT's:  Recent Labs   Lab Test 08/02/20  0559 08/01/20  0619   * 257*   ALT 26 26   ALKPHOS 265* 267*   BILITOTAL 0.4 0.4     Most Recent INR's and Anticoagulation  Dosing History:  Anticoagulation Dose History     Recent Dosing and Labs Latest Ref Rng & Units 11/24/2014 7/31/2020    INR 0.86 - 1.14 1.18 1.31(H)        Most Recent 3 Troponin's:No lab results found.  Most Recent Cholesterol Panel:No lab results found.  Most Recent 6 Bacteria Isolates From Any Culture (See EPIC Reports for Culture Details):  Recent Labs   Lab Test 11/24/14  1300   CULT Normal oral tess     Most Recent TSH, T4 and A1c Labs:No lab results found.  Results for orders placed or performed during the hospital encounter of 07/31/20   XR Chest Port 1 View    Narrative    PROCEDURE: XR CHEST PORT 1 VW 7/31/2020 9:21 AM    HISTORY: eval for sob    COMPARISONS: None.    TECHNIQUE: Single view.    FINDINGS: Heart is enlarged. There is linear probable atelectasis at  the left lung base. Right lung and pleural spaces are clear.    There is a fracture through the left posterolateral ninth rib. No  pneumothorax is seen.    There is some lucency seen through the heart. This is consistent with  a hernia.         Impression    IMPRESSION: Left rib fracture. No pneumothorax. Linear atelectasis  left lung base.    JORDAAN RODAS MD   CT Abdomen Pelvis w Contrast    Narrative    CT ABDOMEN PELVIS W CONTRAST    CLINICAL HISTORY: Male, age 69 years,  Abd pain, gastroenteritis or  colitis suspected;    Comparison:  None.    TECHNIQUE:  CT was performed of the abdomen and pelvis with IV  contrast. Sagittal, coronal and axial reconstructions were reviewed.     FINDINGS:    Abdomen/Pelvis CT:  Lung Bases:  Peripheral and dependent atelectasis seen in the lung  bases. Small bilateral pleural effusions. Large hiatal hernia.    Esophagus/stomach: Large hiatal hernia. The majority the stomach  appears to be displaced into the thoracic cavity.    Liver:  Numerous low dense lesions, concerning for metastatic disease.  The largest mass measures approximately 4.7 cm in all 3 dimensions and  is located anteriorly in the right  lobe, segment 8.    Portal venous system: Patent.  Gallbladder: Small volume of pericholecystic fluid which is appears to  be related to changes elsewhere in the peritoneal cavity.    Spleen: Numerous granulomata throughout the spleen. No acute  abnormality.    Pancreas: Normal. The contour of the pancreas is altered by shifting  anatomy secondary to displacement of soft tissues into the thoracic  cavity via the hiatal hernia.    Adrenal Glands: Normal.    Kidneys: Normal.  Ureters: Normal.  Urinary bladder: Mild wall thickening of the urinary bladder.    Abdominal Aorta: Scattered atherosclerotic calcifications.  IVC: Normal.    Lymph Nodes: Abnormal. Soft tissue nodule suggesting enlarged lymph  nodes are seen within the peritoneal/mesenteric fat of the right  hemiabdomen.    Large and Small Bowel: There is a circumferential mass within the  hepatic flexure the colon, likely representing neoplasm. This  circumferential mass obstructs the GI tract. More distally, the colon  demonstrates more normal contour.    Appendix: Fluid-filled, moderately distended.    Pelvic Organs:  Moderate prostate hypertrophy.  Peritoneum: Moderate volume of free fluid extends into the lower  pelvis.    Bony structures: Schmorl's nodes throughout the lumbar spine and  visualized portions of the thoracic spine, most evident along the  inferior endplate of L4. No distinct evidence of destructive lesion.    Other Findings:  Large left inguinal hernia contains a loop of  nondistended colon, mesenteric fat/peritoneal fat and small volume of  fluid.      Impression    IMPRESSION:   Circumferential mass of the hepatic flexure likely representing  malignancy with metastatic disease to the liver and  intraperitoneal/mesenteric lymph nodes.     Distal obstruction secondary to the circumferential mass.    Large hiatal hernia without distinct evidence of acute associated  abnormality.    Large left inguinal hernia contains a loop of nondistended  colon  without acute abnormality.    ALESHIA REGAN MD   XR Abdomen Port 1 View    Narrative    PROCEDURE:  XR ABDOMEN PORT 1 VW    HISTORY:  abd distention after surgery.    TECHNIQUE:  AP and supine radiographs of the abdomen.    COMPARISON:  7/31/2020.    FINDINGS:     The bowel is diffusely mildly prominent and air-filled.       Impression    IMPRESSION:    Probable postoperative ileus. Continued follow-up is advised.    GINA CANSECO MD

## 2020-08-06 NOTE — PLAN OF CARE
Accessed chart - pt daughter called (Sweetie), she was inquiring about oncology appointment - was explained that referral was sent and that oncology would reach out to them and set up initial appointment - and if they do not hear from them in the next week to call them - number given.

## 2020-08-07 NOTE — PROGRESS NOTES
Subjective     Rudy Stevenson is a 69 year old male who presents to clinic today for the following health issues:    HPI         Hospital Follow-up Visit:    Hospital/Nursing Home/IP Rehab Facility: [facility]  Date of Admission: 7/31  Date of Discharge: 8/5  Reason(s) for Admission: mass of hepatic flexure of colon       Was your hospitalization related to COVID-19? No   Problems taking medications regularly:  None  Medication changes since discharge: None  Problems adhering to non-medication therapy:  None  Patient is wondering about some pain medication. Was told yesterday ( homecare nurse) someone would give him tramadol but I do not see it was given.     Summary of hospitalization:  [facility] discharge summary reviewed  Diagnostic Tests/Treatments reviewed.  Follow up needed: Oncology  Other Healthcare Providers Involved in Patient s Care:         Homecare  Update since discharge: improved. Post Discharge Medication Reconciliation: discharge medications reconciled, continue medications without change.  Plan of care communicated with patient              Patient Active Problem List   Diagnosis     Mass of colon     Colonic mass     Large bowel obstruction (H)     Mass of hepatic flexure of colon     S/P ileostomy (H)     Hyponatremia     Chronic alcohol use     Nicotine abuse     Anemia due to blood loss, acute     Past Surgical History:   Procedure Laterality Date     EXCISE LESION LIP N/A 12/2/2014    Procedure: EXCISE LESION LIP;  Surgeon: Nahum Hayden DO;  Location: [facility] OR     LAPAROTOMY EXPLORATORY N/A 7/31/2020    Procedure: Exploratory laparotomy, and Loop ileostomy creation, Omentum Biopsy;  Surgeon: Celso Sutherland MD;  Location: [facility] OR       Social History     Tobacco Use     Smoking status: Current Every Day Smoker     Packs/day: 0.50     Years: 4.00     Pack years: 2.00     Smokeless tobacco: Never Used   Substance Use Topics     Alcohol use: Yes     History  reviewed. No pertinent family history.      Current Outpatient Medications   Medication Sig Dispense Refill     acetaminophen (TYLENOL) 325 MG tablet Take 3 tablets (975 mg) by mouth every 6 hours as needed for mild pain       albuterol (PROAIR HFA/PROVENTIL HFA/VENTOLIN HFA) 108 (90 Base) MCG/ACT inhaler Inhale 2 puffs into the lungs every 6 hours as needed for wheezing       umeclidinium (INCRUSE ELLIPTA) 62.5 MCG/INH inhaler Inhale 1 puff into the lungs daily 30 each 0     No Known Allergies    Reviewed and updated as needed this visit by Provider         Review of Systems   Constitutional, HEENT, cardiovascular, pulmonary, gi and gu systems are negative, except as otherwise noted.      Objective    There were no vitals taken for this visit.  There is no height or weight on file to calculate BMI.  Physical Exam   Gen:Appears well, in no apparent distress.   Resp: Lungs CTA without wheeze, rales, rhonchi  Card: RRR  Abd:Round. Well healing ileostomy. Normal bowel sounds. Diffuse TTP.  MS: TTP lumbar paraspinal muscles bilateral. NTTP l-spine.              Assessment & Plan     (K63.89) Mass of colon  (primary encounter diagnosis)  Comment: Appt made to establish cares with Dr. Valladares. Hydrocodone for pain prn  Plan: HYDROcodone-acetaminophen (NORCO) 5-325 MG         Tablet #30                 Tobacco Cessation:   reports that he has been smoking. He has a 2.00 pack-year smoking history. He has never used smokeless tobacco.              No follow-ups on file.    ANA Garibay  [facility]

## 2020-08-10 NOTE — PATIENT INSTRUCTIONS
Thank you for allowing Dr. Aleman and our surgical team to participate in your care. Please call our health unit coordinator at [phone] with scheduling questions or the nurse at [phone] with any other questions or concerns.

## 2020-08-10 NOTE — TELEPHONE ENCOUNTER
Cassy from [facility] called regarding this patient.  Patient's family is exteremly nervous and anxious and would like to get scheduled to see Dr. Rehman.  If you could please call Cassy back at [phone]

## 2020-08-11 NOTE — NURSING NOTE
"Chief Complaint   Patient presents with     Hospital F/U       Initial /50   Pulse 100   Temp 96.9  F (36.1  C) (Tympanic)   Wt 67.6 kg (149 lb)   SpO2 92%   BMI 22.00 kg/m   Estimated body mass index is 22 kg/m  as calculated from the following:    Height as of 7/31/20: 1.753 m (5' 9\").    Weight as of this encounter: 67.6 kg (149 lb).  Medication Reconciliation: complete  Lety Montez MA  "

## 2020-08-11 NOTE — PROGRESS NOTES
"CLINIC NOTE - POST-OP SURGERY  8/11/2020    Patient:Lionel Mazariegos    Procedure: Exploratory laparotomy with biopsy of mesentery and creation of loop ileostomy    This is a 69 year old male who is 2 weeks s/p Exploratory laparotomy with biopsy of mesentery and creation of loop ileostomy.  The patient has no complaints today.     Current Medications:  Current Outpatient Medications   Medication Sig Dispense Refill     acetaminophen (TYLENOL) 325 MG tablet Take 3 tablets (975 mg) by mouth every 6 hours as needed for mild pain       albuterol (PROAIR HFA/PROVENTIL HFA/VENTOLIN HFA) 108 (90 Base) MCG/ACT inhaler Inhale 2 puffs into the lungs every 6 hours as needed for wheezing       HYDROcodone-acetaminophen (NORCO) 5-325 MG tablet 1-2 tabs q6hr prn pain 30 tablet 0     traMADol (ULTRAM) 50 MG tablet Take 100 mg by mouth every 6 hours as needed for severe pain       umeclidinium (INCRUSE ELLIPTA) 62.5 MCG/INH inhaler Inhale 1 puff into the lungs daily 30 each 0       Allergies:  No Known Allergies    PHYSICAL EXAM:   Vital signs: /74 (Cuff Size: Adult Regular)   Pulse 110   Temp 97.7  F (36.5  C) (Tympanic)   Ht 1.753 m (5' 9\")   Wt 67.6 kg (149 lb)   SpO2 100%   BMI 22.00 kg/m     Weight: [unfilled]   BMI: Body mass index is 22 kg/m .   General: Normal, cooperative, in no acute distress   Lungs: clear to auscultation   CV: Regular rate and rhythm without murmer   Abdominal: abdomen is soft without significant tenderness, masses, organomegaly or guarding   Wounds:  Well healed surgical scars consistent with his operation.       PATHOLOGY:  Copath Report   Date Value Ref Range Status   07/31/2020   Final    Patient Name: LIONEL MAZARIEGOS  MR#: [id number]  Specimen #: [id number]  Collected: 7/31/2020  Received: 8/3/2020  Reported: 8/4/2020 14:18  Ordering Phy(s): NICOLE COX  Additional Phy(s): LORI SHEA    For improved result formatting, select 'View Enhanced Report Format' under   Linked " Documents section.    SPECIMEN(S):  Omentum and implants    FINAL DIAGNOSIS:  Omentum, partial resection  - Metastatic poorly differentiated adenocarcinoma (see comment)    COMMENT:  It is possible to narrow the origin of this metastatic tumor with   immunohistochemical stains.  However if the  primary is known it may not be necessary.  If further workup is required   please contact the pathology  Department.    I have personally reviewed all specimens and/or slides, including the   listed special stains, and used them  with my medical judgement to determine or confirm the final diagnosis.    Electronically signed out by:    Camron Diaz M.D.    CLINICAL HISTORY:  Colonic mass; exploratory laparotomy and loop ileostomy creation, omentum   biopsy    GROSS:  There are two pieces of fatty soft tissue which are 11 x 5.3 x 1 cm   together.  There are palpable nodules  within the adipose tissue which grossly is consistent with omentum.    Sectioning the nodules, they cut smoothly  and are yellow and tan.  They are within the omentum, not on the surface.    The largest nodule is approximately  1.5 cm.  Sections are taken through the nodules. (3 in 3 blocks).   (Dictated by: Camron Diaz MD 8/3/2020  02:53 PM)    MICROSCOPIC:  There is metastatic poorly differentiated adenocarcinoma.  There is   glandular formation but there are solid  areas.  The poorly differentiated areas have cells with large vesicular   nuclei.  Many have prominent nucleoli.   There is significant variation in size and shape of the nuclei.  There is   widespread mitotic activity and  there is central necrosis.  The tumor invades adipose tissue with a   desmoplastic response.    CPT Codes  A: 45538-EQ8    COLLECTION SITE:  Client: [facility]  Location: [facility] ()    The technical component of this testing was completed at the [facility], with the  professional component performed at the [facility]  [place], [address] [place] [id number]  ([phone])           ASSESSMENT:    69 year old male who is 2 weeks s/p Exploratory laparotomy with biopsy of mesentery and creation of loop ileostomy.  Doing well.     PLAN:   The patient does have biopsy-proven poorly differentiated adenocarcinoma of the abdomen.  Most likely this from a colon primary.  He has widely metastatic disease.  Presented with obstructing colon cancer.    He is scheduled to see oncology which I think is his next step.    Follow-up he will follow-up with me PRN.      Restrictions : Will continue lifting restrictions of no more than 10 pounds until 6 weeks after surgery

## 2020-08-11 NOTE — NURSING NOTE
"Chief Complaint   Patient presents with     Post-Op - General Surgery     Exploratory laparotomy, and Loop ileostomy creation, Omentum Biopsy       Initial /74 (Cuff Size: Adult Regular)   Pulse 110   Temp 97.7  F (36.5  C) (Tympanic)   Ht 1.753 m (5' 9\")   Wt 67.6 kg (149 lb)   SpO2 100%   BMI 22.00 kg/m   Estimated body mass index is 22 kg/m  as calculated from the following:    Height as of this encounter: 1.753 m (5' 9\").    Weight as of this encounter: 67.6 kg (149 lb).  Medication Reconciliation: complete  Radha Pope LPN    "

## 2020-08-12 NOTE — PROGRESS NOTES
Clinic Care Coordination Contact  Care Team Conversations    Left message with patient for a return call regarding upcoming appointment.  Pam Hatfield RN Oncology Care Coordinator

## 2020-08-12 NOTE — PROGRESS NOTES
Patient will be seeing Dr. Rehman next week. He would like KRAS and MSI testing, PET scan and cbc, CMP, LDH, CEA. Will notify patient of orders.  Pam Hatfield RN Oncology Care Coordinator

## 2020-08-13 NOTE — PROGRESS NOTES
Clinic Care Coordination Contact  Care Team Conversations    Left voicemail with patient regarding upcoming appointment.  Pam Hatfield RN Oncology Care Coordinator

## 2020-08-17 NOTE — TELEPHONE ENCOUNTER
Pt is at [facility] for Stage 4 colon cancer. Pt's family would like a referral to [place] for second opinion.  You saw pt on 8/11.  Referral pended.

## 2020-08-17 NOTE — PROGRESS NOTES
PRE VISIT MEDICAL ONCOLOGY     REASON FOR APPOINTMENT    Type of Cancer -    Pathological Stage (if known)      SYMPTOMS   Symptom onset -   Went into the ER on 7-31-20 with back pain that radiated to pelvis and chest congestion  Medical Provider Seen Initially - ER provider    PROVIDERS  Referring MD - Dr. Aleman  PCP - Dr. Valladares  Surgeon - Dr. Aleman  Other provider(s) seen for consultation or treatment -      TREATMENT TO-DATE FOR THIS CANCER  Biopsy -7-31-20   Metastatic poorly differentiated adenocarcinoma   Surgery - 7-31-20  Exploratory laparotomy with biopsy of mesentery and creation of loop ileostomy.      Chemotherapy -       Oncologist - Consulted with Dr. Patricio while inpatient at  [facility],   Hormonal therapy used -   Other treatments for this Cancer (including over-the-counter) -     PRIOR HISTORY OF CANCER  Cancer - (type/date/treatment/setting)   Treatment (meds)  Radiation - (date/treatment/setting)  Oncologist -   Hormonal Therapy - (type/dates used/prescriber)    RECENT IMAGING STUDIES    (list setting/date)  U/S-Mammogram -   PET -   CT - (CAP)-Chest-Abdomen-Pelvis  /  Head-Neck  Bone Scan (Dexa) -   MRI - Head / Other  ECHO -    X-Ray - last CXR   /   EKG  Other -     LABS PERTINENT TO DIAGNOSIS  Labs drawn - (list most recent type/setting/date)   BMP   CBC w/ Platelets   LDH   CMP   CEA   Creatinine   Other - (list)     CENTRAL LINE/PORT   None   Yes - PIC / PORT - (date/setting)      ADDITIONAL INFORMATION FOR BREAST AND GYN MALIGNANCIES  Age at first menses -   Age at menopause or LMP -   Age at first pregnancy -   Number of pregnancies -   Breast Fed - Yes - Duration          /   Never       HEALTH SCREENING (list most recent dates)  Mammogram -  Colonoscopy -  Dental Exam -   T-Dap -   Pneumonia -   Flu Shot -   Shingles -     FAMILY CANCER HISTORY (list relative/type of cancer)                  TOBACCO USE HISTORY     Type      Cigarettes / Cigars / Pipe /  Smokeless   Duration    Current Every Day - (duration)    Quit - (timeframe)     OTHER PERTINENT CANCER INFORMATION NOT IDENTIFIED ELSEWHERE

## 2020-08-18 NOTE — PROGRESS NOTES
Clinic Care Coordination Contact  Care Team Conversations    Received a return call from Patients daughter. She reports that patient is still in the hospital, however they have decided to continue with Dr. Patricio with [facility] since he has already established care with patient.  Pam Hatfield RN Oncology Care Coordinator

## 2020-09-01 LAB — COPATH REPORT: NORMAL

## 2021-09-18 NOTE — PROGRESS NOTES
Name: Rudy Stevenson    MRN#: [id number]    Reason for Hospitalization: Hyponatremia [E87.1]  Small bowel obstruction (H) [K56.609]  Colonic mass [K63.89]    ELEN: low    Discharge Date: 8/5/2020    Medicare IM letter reviewed with him this morning    Patient / Family response to discharge plan: he understands and agrees    Follow-Up Appt: No future appointments.    Other Providers (Care Coordinator, County Services, PCA services etc): Yes: [facility] updated by phone and discharge orders faxed    Discharge Disposition: home via family      Verenice Bolanos CM RN         RN care coordinator call the patient after reviewing her chart and noting she was recently discharged from [facility] on 9/24/2017 with Atrial fib and hypokalemia. Started on Potassium CL ER20 meq daily last K level 3.9 on 9/24/2017. No TCM appointment made. Scheduled to see Dr. Rivera on 10/06/2017 this is as soon as they have any availability RN care coordinator called office to clarify. Instructed to discontinue cardizem and start sotalol 120 mg every 12 hours per patient.    That's fine we will see   [FreeTextEntry1] : screening colonoscopy
